# Patient Record
Sex: MALE | Race: WHITE | NOT HISPANIC OR LATINO | Employment: FULL TIME | URBAN - NONMETROPOLITAN AREA
[De-identification: names, ages, dates, MRNs, and addresses within clinical notes are randomized per-mention and may not be internally consistent; named-entity substitution may affect disease eponyms.]

---

## 2018-01-29 ENCOUNTER — OFFICE VISIT CONVERTED (OUTPATIENT)
Dept: FAMILY MEDICINE CLINIC | Age: 58
End: 2018-01-29
Attending: FAMILY MEDICINE

## 2018-10-19 ENCOUNTER — OFFICE VISIT CONVERTED (OUTPATIENT)
Dept: FAMILY MEDICINE CLINIC | Age: 58
End: 2018-10-19
Attending: FAMILY MEDICINE

## 2019-06-20 ENCOUNTER — HOSPITAL ENCOUNTER (OUTPATIENT)
Dept: OTHER | Facility: HOSPITAL | Age: 59
Discharge: HOME | End: 2019-06-20
Attending: FAMILY MEDICINE

## 2019-06-20 LAB
ALBUMIN SERPL-MCNC: 4.6 G/DL (ref 3.5–5)
ALBUMIN/GLOB SERPL: 1.6 {RATIO} (ref 1.4–2.6)
ALP SERPL-CCNC: 65 U/L (ref 56–119)
ALT SERPL-CCNC: 14 U/L (ref 10–40)
ANION GAP SERPL CALC-SCNC: 21 MMOL/L (ref 8–19)
AST SERPL-CCNC: 14 U/L (ref 15–50)
BILIRUB SERPL-MCNC: 0.88 MG/DL (ref 0.2–1.3)
BUN SERPL-MCNC: 16 MG/DL (ref 5–25)
BUN/CREAT SERPL: 18 {RATIO} (ref 6–20)
CALCIUM SERPL-MCNC: 9.2 MG/DL (ref 8.7–10.4)
CHLORIDE SERPL-SCNC: 102 MMOL/L (ref 99–111)
CHOLEST SERPL-MCNC: 169 MG/DL (ref 107–200)
CHOLEST/HDLC SERPL: 4.1 {RATIO} (ref 3–6)
CONV CO2: 21 MMOL/L (ref 22–32)
CONV TOTAL PROTEIN: 7.4 G/DL (ref 6.3–8.2)
CREAT UR-MCNC: 0.88 MG/DL (ref 0.7–1.2)
EST. AVERAGE GLUCOSE BLD GHB EST-MCNC: 148 MG/DL
GFR SERPLBLD BASED ON 1.73 SQ M-ARVRAT: >60 ML/MIN/{1.73_M2}
GLOBULIN UR ELPH-MCNC: 2.8 G/DL (ref 2–3.5)
GLUCOSE SERPL-MCNC: 169 MG/DL (ref 70–99)
HBA1C MFR BLD: 6.8 % (ref 3.5–5.7)
HDLC SERPL-MCNC: 41 MG/DL (ref 40–60)
LDLC SERPL CALC-MCNC: 106 MG/DL (ref 70–100)
OSMOLALITY SERPL CALC.SUM OF ELEC: 295 MOSM/KG (ref 273–304)
POTASSIUM SERPL-SCNC: 4.2 MMOL/L (ref 3.5–5.3)
PSA SERPL-MCNC: 0.84 NG/ML (ref 0–4)
SODIUM SERPL-SCNC: 140 MMOL/L (ref 135–147)
TRIGL SERPL-MCNC: 108 MG/DL (ref 40–150)
VLDLC SERPL-MCNC: 22 MG/DL (ref 5–37)

## 2019-06-21 ENCOUNTER — OFFICE VISIT CONVERTED (OUTPATIENT)
Dept: FAMILY MEDICINE CLINIC | Age: 59
End: 2019-06-21
Attending: FAMILY MEDICINE

## 2019-08-12 ENCOUNTER — HOSPITAL ENCOUNTER (OUTPATIENT)
Dept: OTHER | Facility: HOSPITAL | Age: 59
Discharge: HOME | End: 2019-08-12
Attending: FAMILY MEDICINE

## 2019-08-23 ENCOUNTER — OFFICE VISIT CONVERTED (OUTPATIENT)
Dept: NEUROSURGERY | Facility: CLINIC | Age: 59
End: 2019-08-23
Attending: NEUROLOGICAL SURGERY

## 2019-08-23 ENCOUNTER — CONVERSION ENCOUNTER (OUTPATIENT)
Dept: NEUROLOGY | Facility: CLINIC | Age: 59
End: 2019-08-23

## 2019-08-26 ENCOUNTER — OFFICE VISIT CONVERTED (OUTPATIENT)
Dept: FAMILY MEDICINE CLINIC | Age: 59
End: 2019-08-26
Attending: FAMILY MEDICINE

## 2019-08-26 ENCOUNTER — HOSPITAL ENCOUNTER (OUTPATIENT)
Dept: OTHER | Facility: HOSPITAL | Age: 59
Discharge: HOME | End: 2019-08-26
Attending: FAMILY MEDICINE

## 2019-08-26 LAB — TSH SERPL-ACNC: 1.17 M[IU]/L (ref 0.27–4.2)

## 2019-08-28 ENCOUNTER — CONVERSION ENCOUNTER (OUTPATIENT)
Dept: OTOLARYNGOLOGY | Facility: CLINIC | Age: 59
End: 2019-08-28

## 2019-08-28 ENCOUNTER — OFFICE VISIT CONVERTED (OUTPATIENT)
Dept: OTOLARYNGOLOGY | Facility: CLINIC | Age: 59
End: 2019-08-28
Attending: OTOLARYNGOLOGY

## 2019-09-10 ENCOUNTER — HOSPITAL ENCOUNTER (OUTPATIENT)
Dept: ULTRASOUND IMAGING | Facility: HOSPITAL | Age: 59
Discharge: HOME | End: 2019-09-10
Attending: OTOLARYNGOLOGY

## 2019-09-18 ENCOUNTER — OFFICE VISIT CONVERTED (OUTPATIENT)
Dept: OTOLARYNGOLOGY | Facility: CLINIC | Age: 59
End: 2019-09-18
Attending: OTOLARYNGOLOGY

## 2019-11-15 ENCOUNTER — OFFICE VISIT CONVERTED (OUTPATIENT)
Dept: NEUROSURGERY | Facility: CLINIC | Age: 59
End: 2019-11-15
Attending: NEUROLOGICAL SURGERY

## 2019-11-15 ENCOUNTER — CONVERSION ENCOUNTER (OUTPATIENT)
Dept: NEUROLOGY | Facility: CLINIC | Age: 59
End: 2019-11-15

## 2019-12-13 ENCOUNTER — HOSPITAL ENCOUNTER (OUTPATIENT)
Dept: OTHER | Facility: HOSPITAL | Age: 59
Discharge: HOME | End: 2019-12-13
Attending: FAMILY MEDICINE

## 2019-12-13 ENCOUNTER — OFFICE VISIT CONVERTED (OUTPATIENT)
Dept: NEUROSURGERY | Facility: CLINIC | Age: 59
End: 2019-12-13
Attending: PHYSICIAN ASSISTANT

## 2019-12-13 LAB
ALBUMIN SERPL-MCNC: 4.3 G/DL (ref 3.5–5)
ALBUMIN/GLOB SERPL: 1.5 {RATIO} (ref 1.4–2.6)
ALP SERPL-CCNC: 73 U/L (ref 56–119)
ALT SERPL-CCNC: 14 U/L (ref 10–40)
ANION GAP SERPL CALC-SCNC: 18 MMOL/L (ref 8–19)
AST SERPL-CCNC: 13 U/L (ref 15–50)
BILIRUB SERPL-MCNC: 0.59 MG/DL (ref 0.2–1.3)
BUN SERPL-MCNC: 18 MG/DL (ref 5–25)
BUN/CREAT SERPL: 18 {RATIO} (ref 6–20)
CALCIUM SERPL-MCNC: 9.4 MG/DL (ref 8.7–10.4)
CHLORIDE SERPL-SCNC: 104 MMOL/L (ref 99–111)
CHOLEST SERPL-MCNC: 154 MG/DL (ref 107–200)
CHOLEST/HDLC SERPL: 4.3 {RATIO} (ref 3–6)
CONV CO2: 25 MMOL/L (ref 22–32)
CONV TOTAL PROTEIN: 7.1 G/DL (ref 6.3–8.2)
CREAT UR-MCNC: 1 MG/DL (ref 0.7–1.2)
EST. AVERAGE GLUCOSE BLD GHB EST-MCNC: 183 MG/DL
GFR SERPLBLD BASED ON 1.73 SQ M-ARVRAT: >60 ML/MIN/{1.73_M2}
GLOBULIN UR ELPH-MCNC: 2.8 G/DL (ref 2–3.5)
GLUCOSE SERPL-MCNC: 213 MG/DL (ref 70–99)
HBA1C MFR BLD: 8 % (ref 3.5–5.7)
HDLC SERPL-MCNC: 36 MG/DL (ref 40–60)
LDLC SERPL CALC-MCNC: 95 MG/DL (ref 70–100)
OSMOLALITY SERPL CALC.SUM OF ELEC: 302 MOSM/KG (ref 273–304)
POTASSIUM SERPL-SCNC: 4.5 MMOL/L (ref 3.5–5.3)
SODIUM SERPL-SCNC: 142 MMOL/L (ref 135–147)
TRIGL SERPL-MCNC: 113 MG/DL (ref 40–150)
VLDLC SERPL-MCNC: 23 MG/DL (ref 5–37)

## 2019-12-23 ENCOUNTER — OFFICE VISIT CONVERTED (OUTPATIENT)
Dept: FAMILY MEDICINE CLINIC | Age: 59
End: 2019-12-23
Attending: FAMILY MEDICINE

## 2020-06-26 ENCOUNTER — OFFICE VISIT CONVERTED (OUTPATIENT)
Dept: FAMILY MEDICINE CLINIC | Age: 60
End: 2020-06-26
Attending: FAMILY MEDICINE

## 2020-06-29 ENCOUNTER — HOSPITAL ENCOUNTER (OUTPATIENT)
Dept: OTHER | Facility: HOSPITAL | Age: 60
Discharge: HOME | End: 2020-06-29
Attending: FAMILY MEDICINE

## 2020-07-01 LAB
ALBUMIN SERPL-MCNC: 4.3 G/DL (ref 3.5–5)
ALBUMIN/GLOB SERPL: 1.7 {RATIO} (ref 1.4–2.6)
ALP SERPL-CCNC: 54 U/L (ref 56–119)
ALT SERPL-CCNC: 11 U/L (ref 10–40)
ANION GAP SERPL CALC-SCNC: 20 MMOL/L (ref 8–19)
AST SERPL-CCNC: 12 U/L (ref 15–50)
BILIRUB SERPL-MCNC: 0.85 MG/DL (ref 0.2–1.3)
BUN SERPL-MCNC: 15 MG/DL (ref 5–25)
BUN/CREAT SERPL: 16 {RATIO} (ref 6–20)
CALCIUM SERPL-MCNC: 9.2 MG/DL (ref 8.7–10.4)
CHLORIDE SERPL-SCNC: 105 MMOL/L (ref 99–111)
CHOLEST SERPL-MCNC: 156 MG/DL (ref 107–200)
CHOLEST/HDLC SERPL: 4.2 {RATIO} (ref 3–6)
CONV CO2: 20 MMOL/L (ref 22–32)
CONV CREATININE URINE, RANDOM: 218.6 MG/DL (ref 10–300)
CONV MICROALBUM.,U,RANDOM: <12 MG/L (ref 0–20)
CONV TOTAL PROTEIN: 6.9 G/DL (ref 6.3–8.2)
CREAT UR-MCNC: 0.91 MG/DL (ref 0.7–1.2)
EST. AVERAGE GLUCOSE BLD GHB EST-MCNC: 131 MG/DL
GFR SERPLBLD BASED ON 1.73 SQ M-ARVRAT: >60 ML/MIN/{1.73_M2}
GLOBULIN UR ELPH-MCNC: 2.6 G/DL (ref 2–3.5)
GLUCOSE SERPL-MCNC: 146 MG/DL (ref 70–99)
HBA1C MFR BLD: 6.2 % (ref 3.5–5.7)
HDLC SERPL-MCNC: 37 MG/DL (ref 40–60)
LDLC SERPL CALC-MCNC: 99 MG/DL (ref 70–100)
MICROALBUMIN/CREAT UR: 5.5 MG/G{CRE} (ref 0–25)
OSMOLALITY SERPL CALC.SUM OF ELEC: 295 MOSM/KG (ref 273–304)
POTASSIUM SERPL-SCNC: 3.9 MMOL/L (ref 3.5–5.3)
SODIUM SERPL-SCNC: 141 MMOL/L (ref 135–147)
TRIGL SERPL-MCNC: 101 MG/DL (ref 40–150)
VLDLC SERPL-MCNC: 20 MG/DL (ref 5–37)

## 2021-01-04 ENCOUNTER — HOSPITAL ENCOUNTER (OUTPATIENT)
Dept: OTHER | Facility: HOSPITAL | Age: 61
Discharge: HOME | End: 2021-01-04
Attending: FAMILY MEDICINE

## 2021-01-04 ENCOUNTER — OFFICE VISIT CONVERTED (OUTPATIENT)
Dept: FAMILY MEDICINE CLINIC | Age: 61
End: 2021-01-04
Attending: FAMILY MEDICINE

## 2021-01-04 LAB
ALBUMIN SERPL-MCNC: 4.5 G/DL (ref 3.5–5)
ALBUMIN/GLOB SERPL: 1.5 {RATIO} (ref 1.4–2.6)
ALP SERPL-CCNC: 73 U/L (ref 56–119)
ALT SERPL-CCNC: 14 U/L (ref 10–40)
ANION GAP SERPL CALC-SCNC: 14 MMOL/L (ref 8–19)
AST SERPL-CCNC: 15 U/L (ref 15–50)
BILIRUB SERPL-MCNC: 0.71 MG/DL (ref 0.2–1.3)
BUN SERPL-MCNC: 14 MG/DL (ref 5–25)
BUN/CREAT SERPL: 15 {RATIO} (ref 6–20)
CALCIUM SERPL-MCNC: 10 MG/DL (ref 8.7–10.4)
CHLORIDE SERPL-SCNC: 99 MMOL/L (ref 99–111)
CHOLEST SERPL-MCNC: 192 MG/DL (ref 107–200)
CHOLEST/HDLC SERPL: 4.8 {RATIO} (ref 3–6)
CONV CO2: 27 MMOL/L (ref 22–32)
CONV TOTAL PROTEIN: 7.5 G/DL (ref 6.3–8.2)
CREAT UR-MCNC: 0.95 MG/DL (ref 0.7–1.2)
EST. AVERAGE GLUCOSE BLD GHB EST-MCNC: 131 MG/DL
GFR SERPLBLD BASED ON 1.73 SQ M-ARVRAT: >60 ML/MIN/{1.73_M2}
GLOBULIN UR ELPH-MCNC: 3 G/DL (ref 2–3.5)
GLUCOSE SERPL-MCNC: 224 MG/DL (ref 70–99)
HBA1C MFR BLD: 6.2 % (ref 3.5–5.7)
HDLC SERPL-MCNC: 40 MG/DL (ref 40–60)
LDLC SERPL CALC-MCNC: 103 MG/DL (ref 70–100)
OSMOLALITY SERPL CALC.SUM OF ELEC: 289 MOSM/KG (ref 273–304)
POTASSIUM SERPL-SCNC: 4.4 MMOL/L (ref 3.5–5.3)
SODIUM SERPL-SCNC: 136 MMOL/L (ref 135–147)
TRIGL SERPL-MCNC: 246 MG/DL (ref 40–150)
VLDLC SERPL-MCNC: 49 MG/DL (ref 5–37)

## 2021-05-15 VITALS
TEMPERATURE: 98 F | SYSTOLIC BLOOD PRESSURE: 134 MMHG | WEIGHT: 211 LBS | BODY MASS INDEX: 26.24 KG/M2 | HEIGHT: 75 IN | DIASTOLIC BLOOD PRESSURE: 81 MMHG | RESPIRATION RATE: 16 BRPM | HEART RATE: 72 BPM | OXYGEN SATURATION: 99 %

## 2021-05-15 VITALS
DIASTOLIC BLOOD PRESSURE: 67 MMHG | HEIGHT: 75 IN | WEIGHT: 211.5 LBS | SYSTOLIC BLOOD PRESSURE: 124 MMHG | BODY MASS INDEX: 26.3 KG/M2

## 2021-05-15 VITALS
BODY MASS INDEX: 25.99 KG/M2 | HEIGHT: 75 IN | RESPIRATION RATE: 16 BRPM | WEIGHT: 209 LBS | SYSTOLIC BLOOD PRESSURE: 122 MMHG | DIASTOLIC BLOOD PRESSURE: 72 MMHG | OXYGEN SATURATION: 99 % | HEART RATE: 63 BPM | TEMPERATURE: 98.3 F

## 2021-05-15 VITALS
SYSTOLIC BLOOD PRESSURE: 128 MMHG | DIASTOLIC BLOOD PRESSURE: 70 MMHG | BODY MASS INDEX: 26.14 KG/M2 | WEIGHT: 210.25 LBS | HEIGHT: 75 IN

## 2021-05-15 VITALS
DIASTOLIC BLOOD PRESSURE: 61 MMHG | HEIGHT: 75 IN | WEIGHT: 210.37 LBS | SYSTOLIC BLOOD PRESSURE: 107 MMHG | BODY MASS INDEX: 26.16 KG/M2

## 2021-05-18 NOTE — PROGRESS NOTES
Octavio Mercado  1960     Office/Outpatient Visit    Visit Date: Mon, Dec 23, 2019 09:44 am    Provider: Jaden Carlson MD (Assistant: Olya Blackwood MA)    Location: [facility]        Electronically signed by Jaden Carlson MD on  01/03/2020 07:25:07 AM                             Subjective:        CC: Hardeep is a 59 year old White male.  This is a follow-up visit.  check up         HPI:       He is got diabetes mellitus and does not really check his blood sugars all that often he says.He has been on vacation to [facility] and probably not adhering to the diet as well as he usually would.  His recent labs show significant elevation in blood sugar and hemoglobin A1c at 8.0.He says he is going to tighten up his diet see if he can bring the numbers under better control.  I am not having to monitor his blood sugars on a daily basis and after a few weeks drop off list of those readings for me to review    Hardeep presents with type 2 diabetes mellitus without complications.  Compliance with treatment has been good.  He specifically denies associated symptoms, including nocturia, polydipsia and polyuria.  He reports home blood glucose readings have been excellent, with average fasting glucoses running <120 mg/dL. He checks his glucose 1 to 2 times daily.            Essential (primary) hypertension details; compliance with treatment has been good.  He is tolerating the medication well without side effects.  He did not bring his blood pressure diary, but says that pressures have been okay.        Tells me is continued have some issues at times with right shoulder and arm pain.  He was sent by [employer] to some kind of [employer] health [facility] and [place] but we have not seen the results of that report.      He did see ENT in regards to the thyroid nodule.  He is supposed to follow-up with him after the first of the year.    ROS:     CONSTITUTIONAL:  Negative for chills, fatigue and  fever.      CARDIOVASCULAR:  Negative for chest pain, orthopnea, paroxysmal nocturnal dyspnea and pedal edema.      RESPIRATORY:  Negative for dyspnea and cough.      GASTROINTESTINAL:  Negative for abdominal pain, heartburn, constipation, diarrhea, and stool changes.      GENITOURINARY:  Negative for dysuria and polyuria.      PSYCHIATRIC:  Negative for anxiety and depression.          Past Medical History / Family History / Social History:         Last Reviewed on 12/23/2019 10:50 AM by Jaden Carlson    Past Medical History:         Positive for    fell off ladder 2014;         PREVENTIVE HEALTH MAINTENANCE             COLORECTAL CANCER SCREENING: Up to date (colonoscopy q10y; sigmoidoscopy q5y; Cologuard q3y) was last done 05/19/17, Results are in chart         Surgical History:         Cholecystectomy: 11/00;         Family History:     Father: Myocardial Infarction;  Esophageal Cancer     Positive for Pancreatric Cancer ( pat. GF; mat. aunt -- x2 ).      Positive for Type 2 Diabetes ( mat. GF; pat. uncle; pat. aunt ).      Positive for Alcoholism ( father ).          Social History:  for [employer]         Tobacco/Alcohol/Supplements:     Last Reviewed on 12/23/2019 09:45 AM by Olya Blackwood    Tobacco: He has never smoked.          Alcohol: Frequency:    rare;         Substance Abuse History:     Last Reviewed on 3/29/2017 09:20 AM by Jaden Carlson        Mental Health History:     Last Reviewed on 3/29/2017 09:20 AM by Jaden Carlson        Communicable Diseases (eg STDs):     Last Reviewed on 3/29/2017 09:20 AM by Jaden Carlson        Allergies:     Last Reviewed on 12/23/2019 09:45 AM by Olya Blackwood    No Known Allergies.        Current Medications:     Last Reviewed on 12/23/2019 09:45 AM by Olya Blackwood    Metformin HCl 500mg Tablet [Take 1 tablet(s) by mouth bid]    Lisinopril 10mg Tablet [Take 1 tablet(s) by mouth daily]        Objective:        Vitals:          Current: 12/23/2019 9:51:08 AM    Ht:  6 ft, 3 in;  Wt: 217 lbs;  BMI: 27.1T: 98.1 F (oral);  BP: 131/64 mm Hg (left arm, sitting);  P: 69 bpm (left arm (BP Cuff), sitting);  sCr: 1 mg/dL;  GFR: 88.77        Exams:     PHYSICAL EXAM:     GENERAL: mildly obese;  well groomed;  no apparent distress;     EYES: Nonicteric and with unremarkable lids, iris and pupils;     E/N/T: EARS: unremarkable; OROPHARYNX: clear;     NECK: thyroid exam reveals no discrete nodules;  carotid exam reveals no bruits;     RESPIRATORY: normal respiratory rate and pattern with no distress; normal breath sounds with no rales, rhonchi, wheezes or rubs;     CARDIOVASCULAR: normal rate; rhythm is regular;  no systolic murmur;     LYMPHATIC: no enlargement of cervical or facial nodes;     MUSCULOSKELETAL: normal overall tone No pedal edema.;     NEUROLOGIC: No lateralizing deficit.   Monofilament test good  bilat feet.;     PSYCHIATRIC:  appropriate affect and demeanor; normal speech pattern; grossly normal memory;     Right foot exam    Protective sensation using Monofilament test: NORMAL sensation. Patient detects .07 grams of force which is considered normal.    Vascular status: normal peripheral vascular exam with palpable dorsal pedal and posterior tibal pulses and brisk digital capillary refill    Skin is intact without sores or ulcers         Assessment:         E11.9   Type 2 diabetes mellitus without complications       I10   Essential (primary) hypertension       M25.511   Pain in right shoulder       E04.0   Nontoxic diffuse goiter           ORDERS:         Meds Prescribed:       [Refilled] Lisinopril 10 mg oral tablet [Take 1 tablet(s) by mouth daily], #90 (ninety) tablets, Refills: 1 (one)       [Refilled] metFORMIN 500 mg oral tablet [Take 1 tablet(s) by mouth bid], #180 (one hundred and eighty) tablets, Refills: 1 (one)         Other Orders:       2028F  Foot examination performed (includes examination through visual inspection,  sensory exam with monofilament, and pulse exam - report when any of the three components are completed) (DM)4  (In-House)                      Plan:         Type 2 diabetes mellitus without complicationsWill have him keep log of BS and then if can't get BS down consider adding other meds          Prescriptions:       [Refilled] metFORMIN 500 mg oral tablet [Take 1 tablet(s) by mouth bid], #180 (one hundred and eighty) tablets, Refills: 1 (one)         Essential (primary) hypertension          Prescriptions:       [Refilled] Lisinopril 10 mg oral tablet [Take 1 tablet(s) by mouth daily], #90 (ninety) tablets, Refills: 1 (one)         Pain in right shoulderWe will send for copy of records from his work health doctor        Nontoxic diffuse goiterHe will follow-up with ENT after the first of the year in regards to the thyroid nodule            Other Orders      2028F  Foot examination performed (includes examination through visual inspection, sensory exam with monofilament, and pulse exam - report when any of the three components are completed) (DM)4  (In-House)              Other Patient Education Handouts:     Dragon transcription disclaimer:        Much of this encounter note is an electronic transcription/translation of spoken language to printed text.  The electronic translation of spoken language may permit erroneous, or at times, nonsensical words or phrases to be inadvertently transcribed.  Although I have reviewed the note for such errors, some may still exist.        Charge Capture:         Primary Diagnosis:     E11.9  Type 2 diabetes mellitus without complications           Orders:      09631  Office/outpatient visit; established patient, level 4  (In-House)              I10  Essential (primary) hypertension     M25.511  Pain in right shoulder     E04.0  Nontoxic diffuse goiter         Other Orders:       2028F  Foot examination performed (includes examination through visual inspection, sensory exam with  monofilament, and pulse exam - report when any of the three components are completed) (DM)4  (In-House)

## 2021-05-18 NOTE — PROGRESS NOTES
Octavio Mercado  1960     Office/Outpatient Visit    Visit Date: Mon, Jan 4, 2021 08:14 am    Provider: Jaden Carlson MD (Assistant: Genoveva Sampson MA)    Location: [facility]        Electronically signed by Jaden Carlson MD on  01/04/2021 08:46:52 AM                             Subjective:        CC: Hardeep is a 60 year old White male.  This is a follow-up visit.  6 months         HPI:           Hardeep presents with type 2 diabetes mellitus without complications.  Compliance with treatment has been good.  He specifically denies associated symptoms, including nocturia, polydipsia and polyuria.  He reports home blood glucose readings have been excellent, with average fasting glucoses running <120 mg/dL. He checks his glucose 1 to 2 times daily.            In regard to the essential (primary) hypertension, compliance with treatment has been good.  He is tolerating the medication well without side effects.  Hardeep does not check his blood pressure other than at his clinic appointments.  Will geet BP ck when donates blood and usually good.    ROS:     CONSTITUTIONAL:  Negative for chills, fatigue and fever.      CARDIOVASCULAR:  Negative for chest pain, orthopnea, paroxysmal nocturnal dyspnea and pedal edema.      RESPIRATORY:  Negative for dyspnea and cough.      GASTROINTESTINAL:  Negative for abdominal pain, heartburn, constipation, diarrhea, and stool changes.      GENITOURINARY:  Negative for dysuria and polyuria.      PSYCHIATRIC:  Negative for anxiety and depression.          Past Medical History / Family History / Social History:         Last Reviewed on 1/04/2021 08:37 AM by Jaden Carlson    Past Medical History:         Positive for    fell off ladder 2014;         PREVENTIVE HEALTH MAINTENANCE             COLORECTAL CANCER SCREENING: Up to date (colonoscopy q10y; sigmoidoscopy q5y; Cologuard q3y) was last done 05/19/17, Results are in chart         Surgical  History:         Cholecystectomy: 11/00;         Family History:     Father: Myocardial Infarction;  Esophageal Cancer     Positive for Pancreatric Cancer ( pat. GF; mat. aunt -- x2 ).      Positive for Type 2 Diabetes ( mat. GF; pat. uncle; pat. aunt ).      Positive for Alcoholism ( father ).          Social History: [place] for [employer]         Tobacco/Alcohol/Supplements:     Last Reviewed on 1/04/2021 08:17 AM by Genoveva Sampson    Tobacco: He has never smoked.          Alcohol: Frequency:    rare;         Substance Abuse History:     Last Reviewed on 3/29/2017 09:20 AM by Jaden Carlson        Mental Health History:     Last Reviewed on 3/29/2017 09:20 AM by Jaden Carlson        Communicable Diseases (eg STDs):     Last Reviewed on 3/29/2017 09:20 AM by Jaden Carlson        Allergies:     Last Reviewed on 1/04/2021 08:17 AM by Genoveva Sampson    No Known Allergies.        Current Medications:     Last Reviewed on 1/04/2021 08:17 AM by Genoveva Sampson    metFORMIN 500 mg oral Tablet, Extended Release 24 hr [TAKE 2 TABLETS BY MOUTH TWICE DAILY WITH MEALS]    Lisinopril 10 mg oral tablet [Take 1 tablet(s) by mouth daily]        Objective:        Vitals:         Current: 1/4/2021 8:19:13 AM    Ht:  6 ft, 3 in;  Wt: 211 lbs;  BMI: 26.4T: 96.7 F (temporal);  BP: 132/68 mm Hg (left arm, sitting);  P: 72 bpm (left arm (BP Cuff), sitting);  sCr: 0.91 mg/dL;  GFR: 95.25        Exams:     PHYSICAL EXAM:     GENERAL:  well developed and nourished; appropriately groomed; in no apparent distress;     EYES: Nonicteric and with unremarkable lids, iris and pupils;     NECK: carotid exam reveals no bruits;     RESPIRATORY: normal respiratory rate and pattern with no distress; normal breath sounds with no rales, rhonchi, wheezes or rubs;     CARDIOVASCULAR: normal rate; rhythm is regular;  no systolic murmur;     LYMPHATIC: no enlargement of cervical or facial nodes;     MUSCULOSKELETAL: normal overall tone No  pedal edema.;     NEUROLOGIC: No lateralizing deficit.;     PSYCHIATRIC:  appropriate affect and demeanor; normal speech pattern; grossly normal memory;     Right foot exam    Protective sensation using Monofilament test: NORMAL sensation. Patient detects .07 grams of force which is considered normal.    Vascular status: normal peripheral vascular exam with palpable dorsal pedal and posterior tibal pulses and brisk digital capillary refill    Skin is intact without sores or ulcers         Assessment:         E11.9   Type 2 diabetes mellitus without complications       I10   Essential (primary) hypertension           ORDERS:         Meds Prescribed:       [Refilled] Lisinopril 10 mg oral tablet [Take 1 tablet(s) by mouth daily], #90 (ninety) tablets, Refills: 1 (one)       [Refilled] metFORMIN 500 mg oral Tablet, Extended Release 24 hr [TAKE 2 TABLETS BY MOUTH TWICE DAILY WITH MEALS], #360 (three hundred and sixty) tablets, Refills: 1 (one)         Lab Orders:       [id number]  DIAB - [facility] LIPID,CMP, A1C: 71691, 99521, 02273  (Send-Out)              Other Orders:       2028F  Foot examination performed (includes examination through visual inspection, sensory exam with monofilament, and pulse exam - report when any of the three components are completed) (DM)4  (In-House)                      Plan:         Type 2 diabetes mellitus without complicationsremind him to get eye exam    LABORATORY:  Labs ordered to be performed today include Diabetes Panel 1; CMP, Lipid, A1C.            Prescriptions:       [Refilled] metFORMIN 500 mg oral Tablet, Extended Release 24 hr [TAKE 2 TABLETS BY MOUTH TWICE DAILY WITH MEALS], #360 (three hundred and sixty) tablets, Refills: 1 (one)           Orders:       [id number]  DIAB - [facility] LIPID,CMP, A1C: 53871, 76200, 53818  (Send-Out)              Essential (primary) hypertension          Prescriptions:       [Refilled] Lisinopril 10 mg oral tablet [Take 1 tablet(s) by mouth daily], #90 (ninety)  tablets, Refills: 1 (one)             Other Orders      2028F  Foot examination performed (includes examination through visual inspection, sensory exam with monofilament, and pulse exam - report when any of the three components are completed) (DM)4  (In-House)              Charge Capture:         Primary Diagnosis:     E11.9  Type 2 diabetes mellitus without complications           Orders:      [id number]  Office/outpatient visit; established patient, level 3  (In-House)              I10  Essential (primary) hypertension         Other Orders:       2028F  Foot examination performed (includes examination through visual inspection, sensory exam with monofilament, and pulse exam - report when any of the three components are completed) (DM)4  (In-House)              ADDENDUMS:      ____________________________________    Addendum: 01/07/2021 09:49 PM - Jaden Carlson        Remove   [id number]        Add   [id number]

## 2021-05-18 NOTE — PROGRESS NOTES
Octavio Mercado 1960     Office/Outpatient Visit    Visit Date: Mon, Aug 26, 2019 12:56 pm    Provider: Jaden Carlson MD (Assistant: Sarah Spurling, MA)    Location: [facility]        Electronically signed by Jaden Carlson MD on  08/26/2019 05:24:46 PM                             SUBJECTIVE:        CC:     Hardeep is a 59 year old White male.  Here to discuss labs.          HPI:     Hardeep and been getting work-up for his neck and shoulder pain and work health clinic that he went to ordered an MRI scan of his neck.  I reviewed the report with him.  The MRI was obtained at [facility].  They mention that he had a thyroid nodule and recommended ultrasound follow-up.  So before his appointment today I went on to have him schedule an ultrasound and we reviewed that result today as well.  The radiologist recommends proceeding with ultrasound-guided biopsy.     ROS:     CONSTITUTIONAL:  Negative for chills, fatigue and fever.      CARDIOVASCULAR:  Negative for chest pain, orthopnea, paroxysmal nocturnal dyspnea and pedal edema.      RESPIRATORY:  Negative for dyspnea and cough.      GASTROINTESTINAL:  Negative for abdominal pain, heartburn, constipation, diarrhea, and stool changes.      GENITOURINARY:  Negative for dysuria and polyuria.      PSYCHIATRIC:  Negative for anxiety and depression.          [facility]/SH:     Last Reviewed on 6/21/2019 10:20 AM by Jaden Carlson    Past Medical History:         Positive for    fell off ladder 2014;         PREVENTIVE HEALTH MAINTENANCE             COLORECTAL CANCER SCREENING: Up to date (colonoscopy q10y; sigmoidoscopy q5y; Cologuard q3y) was last done 05/19/17, Results are in chart         Surgical History:         Cholecystectomy: 11/00;         Family History:     Father: Myocardial Infarction;  Esophageal Cancer     Positive for Pancreatric Cancer ( pat. GF; mat. aunt -- x2 ).      Positive for Type 2 Diabetes ( mat. GF; pat. uncle; pat.  aunt ).      Positive for Alcoholism ( father ).          Social History:  for [employer]         Tobacco/Alcohol/Supplements:     Last Reviewed on 6/21/2019 09:37 AM by Spurling, Sarah C    Tobacco: He has never smoked.          Alcohol: Frequency:    rare;         Substance Abuse History:     Last Reviewed on 3/29/2017 09:20 AM by Jaden Carlson        Mental Health History:     Last Reviewed on 3/29/2017 09:20 AM by Jaden Carlson        Communicable Diseases (eg STDs):     Last Reviewed on 3/29/2017 09:20 AM by Jaden Carlson            Allergies:     Last Reviewed on 6/21/2019 09:37 AM by Spurling, Sarah C      No Known Drug Allergies.         Current Medications:     Last Reviewed on 6/21/2019 09:41 AM by Spurling, Sarah C    Lisinopril 10mg Tablet Take 1 tablet(s) by mouth daily     Metformin HCl 500mg Tablet Take 1 tablet(s) by mouth bid         OBJECTIVE:        Vitals:         Current: 8/26/2019 1:00:21 PM    Ht:  6 ft, 3 in;  Wt: 210 lbs;  BMI: 26.2    T: 98 F (oral);  BP: 115/70 mm Hg (right arm, sitting);  P: 72 bpm (right arm (BP Cuff), sitting);  sCr: 0.88 mg/dL;  GFR: 99.48        Exams:     PHYSICAL EXAM:     GENERAL: well developed, well nourished;  well groomed;  no apparent distress;     EYES: lids and lacrimal system are normal in appearance; nonicteric;  conjunctiva and cornea are normal; pupils and irises are normal;     E/N/T: EARS: external auditory canal normal;  bilateral TMs are normal;  OROPHARYNX:  normal mucosa, dentition, gingiva, and posterior pharynx;     NECK: trachea is midline; thyroid exam reveals no discrete nodules;  carotid exam reveals no bruits;     RESPIRATORY: normal appearance and symmetric expansion of chest wall; normal respiratory rate and pattern with no distress; normal breath sounds with no rales, rhonchi, wheezes or rubs;     CARDIOVASCULAR: normal rate; rhythm is regular;  a systolic murmur is noted: it is grade 2/6;  no diastolic  murmur; no edema;     GASTROINTESTINAL: nontender; normal bowel sounds; no organomegaly; no abdominal or renal bruits;     LYMPHATIC: no enlargement of cervical or facial nodes;     SKIN: no atypical or suspicious moles;     MUSCULOSKELETAL: muscle strength: grossly normal;     NEUROLOGIC: mental status: alert and oriented x 3; cranial nerves: grossly intact;  no lateralizing or focal neurologic deficits;         ASSESSMENT           241.0   E04.0  Thyroid nodule              DDx:         ORDERS:         Lab Orders:       [id number]  [facility] TSH  (Send-Out)           Procedures Ordered:       REFER  Referral to Specialist or Other Facility  (Send-Out)                   PLAN:          Thyroid nodule I will go ahead and get him into see the ENT folks and let them manage his work-up of the thyroid nodule. I gave him copies of the MRI scan and ultrasound reports.  I did advise him to take a disc of the MRI scan with him to the appointment.     LABORATORY:  Labs ordered to be performed today include TSH.      REFERRALS:  Referral initiated to an E/N/T ( Dr. Gil Day, [facility] ENT ).            Orders:       REFER  Referral to Specialist or Other Facility  (Send-Out)         [id number]  [facility] TSH  (Send-Out)               CHARGE CAPTURE           **Please note: ICD descriptions below are intended for billing purposes only and may not represent clinical diagnoses**        Primary Diagnosis:         241.0 Thyroid nodule            E04.0    Nontoxic diffuse goiter              Orders:          [id number]   Office/outpatient visit; established patient, level 3  (In-House)

## 2021-05-18 NOTE — PROGRESS NOTES
Octavio Mercado 1960     Office/Outpatient Visit    Visit Date: Fri, Oct 19, 2018 10:43 am    Provider: Jaden Carlson MD (Assistant: Rosie Armstrong LPN)    Location: [facility]        Electronically signed by Jaden Carlson MD on  10/25/2018 07:38:23 PM                             SUBJECTIVE:        CC:     Hardeep is a 58 year old White male.  This is a follow-up visit.  med refills         HPI:         Patient presents with hypertension.  He did not bring his blood pressure diary, but says that pressures have been okay.  He is tolerating the medication well without side effects.  Compliance with treatment has been good.          Additionally, he presents with history of type 2 diabetes.  compliance with treatment has been good.  He specifically denies associated symptoms, including nocturia, polydipsia and polyuria.  He reports home blood glucose readings have been excellent, with average fasting glucoses running <120 mg/dL. He checks his glucose 1 to 2 times daily.      ROS:     CONSTITUTIONAL:  Negative for chills, fatigue, fever and weight change.      CARDIOVASCULAR:  Negative for chest pain, orthopnea, paroxysmal nocturnal dyspnea and pedal edema.      RESPIRATORY:  Negative for dyspnea and cough.      GASTROINTESTINAL:  Negative for abdominal pain, heartburn, constipation, diarrhea, and stool changes.      GENITOURINARY:  Negative for dysuria and polyuria.      PSYCHIATRIC:  Negative for anxiety and depression.          PM/Health system/:     Last Reviewed on 10/19/2018 11:19 AM by Jaden Carlson    Past Medical History:         Positive for    fell off ladder 2014;         PREVENTIVE HEALTH MAINTENANCE             COLORECTAL CANCER SCREENING: Up to date (colonoscopy q10y; sigmoidoscopy q5y; Cologuard q3y) was last done 05/19/17, Results are in chart         Surgical History:         Cholecystectomy: 11/00;         Family History:     Father: Myocardial Infarction;  Esophageal Cancer      Positive for Pancreatric Cancer ( pat. GF; mat. aunt -- x2 ).      Positive for Type 2 Diabetes ( mat. GF; pat. uncle; pat. aunt ).      Positive for Alcoholism ( father ).          Social History: [employer] for [employer]         Tobacco/Alcohol/Supplements:     Last Reviewed on 10/19/2018 10:44 AM by Rosie Armstrong    Tobacco: He has never smoked.          Alcohol: Frequency:    rare;         Substance Abuse History:     Last Reviewed on 3/29/2017 09:20 AM by Jaden Carlson        Mental Health History:     Last Reviewed on 3/29/2017 09:20 AM by Jaden Carlson        Communicable Diseases (eg STDs):     Last Reviewed on 3/29/2017 09:20 AM by Jaden Carlson            Allergies:     Last Reviewed on 10/19/2018 10:43 AM by Rosie Armstrong      No Known Drug Allergies.         Current Medications:     Last Reviewed on 10/19/2018 10:44 AM by Rosie Armstrong    Lisinopril 10mg Tablet Take 1 tablet(s) by mouth daily     Metformin HCl 500mg Tablet Take 1 tablet(s) by mouth bid         OBJECTIVE:        Vitals:         Current: 10/19/2018 10:45:31 AM    Ht:  6 ft, 3 in;  Wt: 210.8 lbs;  BMI: 26.3    T: 98.2 F (oral);  BP: 133/76 mm Hg (right arm, sitting);  P: 68 bpm (right arm (BP Cuff), sitting);  sCr: 0.95 mg/dL;  GFR: 93.39        Exams:     PHYSICAL EXAM:     GENERAL:  well developed and nourished; appropriately groomed; in no apparent distress;     EYES: Nonicteric and with unremarkable lids, iris and pupils;     E/N/T:  normal EACs, TMs, nasal/oral mucosa, teeth, gingiva, and oropharynx;     NECK: carotid exam reveals no bruits;     RESPIRATORY: normal respiratory rate and pattern with no distress; normal breath sounds with no rales, rhonchi, wheezes or rubs;     CARDIOVASCULAR: normal rate; rhythm is regular;  no systolic murmur;     LYMPHATIC: no enlargement of cervical or facial nodes;     MUSCULOSKELETAL: normal overall tone No pedal edema.;     NEUROLOGIC: No  lateralizing deficit.;     PSYCHIATRIC:  appropriate affect and demeanor; normal speech pattern; grossly normal memory;         Procedures:     Vaccination against other viral diseases, Influenza     1. Influenza, seasonal PF (children 3 years to adult): 0.5 ml unit dose given IM in the right upper arm; administered by SCS;  lot number [id number]; expires 6-30-19             ASSESSMENT           401.1   I10  Hypertension              DDx:     250.00   E11.9  Type 2 diabetes              DDx:     V04.81   Z23  Vaccination against other viral diseases, Influenza              DDx:         ORDERS:         Meds Prescribed:       Refill of: Lisinopril 10mg Tablet Take 1 tablet(s) by mouth daily  #90 (Ninety) tablet(s) Refills: 1       Refill of: Metformin HCl (Metformin HCl)  500mg Tablet Take 1 tablet(s) by mouth bid  #180 (One [place] and Eighty) tablet(s) Refills: 1         Lab Orders:       APPTO  Appointment need  (In-House)           Other Orders:       10710  Influenza virus vaccine, quadrivalent, split virus, preservative free 3 years of age & older  (In-House)                   PLAN:          Hypertension         FOLLOW-UP: Schedule a follow-up visit in 6 months..            Prescriptions:       Refill of: Lisinopril 10mg Tablet Take 1 tablet(s) by mouth daily  #90 (Ninety) tablet(s) Refills: 1           Orders:       APPTO  Appointment need  (In-House)            Type 2 diabetes           Prescriptions:       Refill of: Metformin HCl (Metformin HCl)  500mg Tablet Take 1 tablet(s) by mouth bid  #180 (One [place] and Eighty) tablet(s) Refills: 1          Vaccination against other viral diseases, Influenza         IMMUNIZATIONS given today: Influenza Quadrivalent psv free shot 3 & up.            Orders:       78435  Influenza virus vaccine, quadrivalent, split virus, preservative free 3 years of age & older  (In-House)               Patient Recommendations:        For  Hypertension:     Schedule a follow-up visit in 6  months.                APPOINTMENT INFORMATION:        Monday Tuesday Wednesday Thursday Friday Saturday Sunday            Time:___________________AM  PM   Date:_____________________             CHARGE CAPTURE           **Please note: ICD descriptions below are intended for billing purposes only and may not represent clinical diagnoses**        Primary Diagnosis:         401.1 Hypertension            I10    Essential (primary) hypertension              Orders:          43031   Office/outpatient visit; established patient, level 3  (In-House)             APPTO   Appointment need  (In-House)           250.00 Type 2 diabetes            E11.9    Type 2 diabetes mellitus without complications    V04.81 Vaccination against other viral diseases, Influenza            Z23    Encounter for immunization              Orders:          [id number]   Influenza virus vaccine, quadrivalent, split virus, preservative free 3 years of age & older  (In-House)               ADDENDUMS:      ____________________________________    Addendum: 10/29/2018 01:11 PM - Spurling, Sarah C         42494  Immunization administration; one vaccine  (In-House)

## 2021-05-18 NOTE — PROGRESS NOTES
Mercado Octavio LUCILA 1960     Office/Outpatient Visit    Visit Date: Fri, Jun 21, 2019 09:33 am    Provider: Jaden Carlson MD (Assistant: Sarah Spurling, MA)    Location: [facility]        Electronically signed by Jaden Carlson MD on  06/21/2019 10:27:22 AM                             SUBJECTIVE:        CC:     Hardeep is a 59 year old White male.  This is a follow-up visit.          HPI:         Patient to be evaluated for hypertension.  He did not bring his blood pressure diary, but says that pressures have been okay.  He is tolerating the medication well without side effects.  Compliance with treatment has been good.          In regard to the type 2 diabetes, compliance with treatment has been good.  He specifically denies associated symptoms, including nocturia, polydipsia and polyuria.  He does not perform home blood glucose monitoring.  He is a little concerned about his blood sugars because he is been on steroids for his neck.  Despite that though his hemoglobin A1c looks great.     He evidently had some kind of a injury at work pulled a muscle in his neck or something that nature. Saw Dannielle Soto and she has him going to [facility], but he says he is not getting any better so he is going back to her next week.      ROS:     CONSTITUTIONAL:  Negative for chills, fatigue and fever.      CARDIOVASCULAR:  Negative for chest pain, orthopnea, paroxysmal nocturnal dyspnea and pedal edema.      RESPIRATORY:  Negative for dyspnea and cough.      GASTROINTESTINAL:  Negative for abdominal pain, heartburn, constipation, diarrhea, and stool changes.      GENITOURINARY:  Negative for dysuria and polyuria.      PSYCHIATRIC:  Negative for anxiety and depression.          PMH/FMH/SH:     Last Reviewed on 6/21/2019 10:20 AM by Jaden Carlson    Past Medical History:         Positive for    fell off ladder 2014;         PREVENTIVE HEALTH MAINTENANCE             COLORECTAL CANCER SCREENING: Up to date  (colonoscopy q10y; sigmoidoscopy q5y; Cologuard q3y) was last done 05/19/17, Results are in chart         Surgical History:         Cholecystectomy: 11/00;         Family History:     Father: Myocardial Infarction;  Esophageal Cancer     Positive for Pancreatric Cancer ( pat. GF; mat. aunt -- x2 ).      Positive for Type 2 Diabetes ( mat. GF; pat. uncle; pat. aunt ).      Positive for Alcoholism ( father ).          Social History:  for [employer]         Tobacco/Alcohol/Supplements:     Last Reviewed on 6/21/2019 09:37 AM by Spurling, Sarah C    Tobacco: He has never smoked.          Alcohol: Frequency:    rare;         Substance Abuse History:     Last Reviewed on 3/29/2017 09:20 AM by Jaden Carlson        Mental Health History:     Last Reviewed on 3/29/2017 09:20 AM by Jaden Carlson        Communicable Diseases (eg STDs):     Last Reviewed on 3/29/2017 09:20 AM by Jaden Carlson            Allergies:     Last Reviewed on 6/21/2019 09:37 AM by Spurling, Sarah C      No Known Drug Allergies.         Current Medications:     Last Reviewed on 6/21/2019 09:41 AM by Spurling, Sarah C    Lisinopril 10mg Tablet Take 1 tablet(s) by mouth daily     Metformin HCl 500mg Tablet Take 1 tablet(s) by mouth bid         OBJECTIVE:        Vitals:         Current: 6/21/2019 9:43:03 AM    Ht:  6 ft, 3 in;  Wt: 205.8 lbs;  BMI: 25.7    T: 98.4 F (oral);  BP: 126/76 mm Hg (left arm, sitting);  P: 73 bpm (left arm (BP Cuff), sitting);  sCr: 0.88 mg/dL;  GFR: 98.63        Exams:     PHYSICAL EXAM:     GENERAL: well developed, well nourished;  well groomed;  no apparent distress;     EYES: Nonicteric and with unremarkable lids, iris and pupils;     E/N/T: EARS: unremarkable; OROPHARYNX: clear;     NECK: thyroid exam reveals no discrete nodules;  carotid exam reveals no bruits; His neck has good range of motion but with right lateral flexion he says it does feel tight and even pulls underneath his right axilla.      RESPIRATORY: normal respiratory rate and pattern with no distress; normal breath sounds with no rales, rhonchi, wheezes or rubs;     CARDIOVASCULAR: normal rate; rhythm is regular;  no systolic murmur;     LYMPHATIC: no enlargement of cervical or facial nodes;     MUSCULOSKELETAL: normal overall tone No pedal edema.;     NEUROLOGIC: No lateralizing deficit.   Monofilament test good  bilat feet.; DTRs are symmetrical at the biceps and  strength is symmetrical bilaterally as well.     PSYCHIATRIC:  appropriate affect and demeanor; normal speech pattern; grossly normal memory;     Left foot exam    Protective sensation using Monofilament test: NORMAL sensation. Patient detects .07 grams of force which is considered normal.    Vascular status: normal peripheral vascular exam with palpable dorsal pedal and posterior tibal pulses and brisk digital capillary refill    Skin is intact without sores or ulcers    Right foot exam    Protective sensation using Monofilament test: NORMAL sensation. Patient detects .07 grams of force which is considered normal.    Vascular status: normal peripheral vascular exam with palpable dorsal pedal and posterior tibal pulses and brisk digital capillary refill    Skin is intact without sores or ulcers         ASSESSMENT           401.1   I10  Hypertension              DDx:     250.00   E11.9  Type 2 diabetes              DDx:     V79.0   Z13.89  Screening for depression              DDx:     847.0   S13.4XXA  Neck strain              DDx:         ORDERS:         Meds Prescribed:       Refill of: Lisinopril 10mg Tablet Take 1 tablet(s) by mouth daily  #90 (Ninety) tablet(s) Refills: 1       Refill of: Metformin HCl 500mg Tablet Take 1 tablet(s) by mouth bid  #180 (One [place] and Eighty) tablet(s) Refills: 1         Other Orders:         Depression screen negative  (In-House)         2028F  Foot examination performed (includes examination through visual inspection, sensory exam  with monofi  (In-House)                   PLAN:          Hypertension Continue on his current medication          Type 2 diabetes Reviewed his labs look great continue on current medication           Prescriptions:       Refill of: Lisinopril 10mg Tablet Take 1 tablet(s) by mouth daily  #90 (Ninety) tablet(s) Refills: 1       Refill of: Metformin HCl 500mg Tablet Take 1 tablet(s) by mouth bid  #180 (One [place] and Eighty) tablet(s) Refills: 1          Screening for depression     MIPS PHQ-9 Depression Screening: Completed form scanned and in chart; Total Score 0; Negative Depression Screen           Orders:         Depression screen negative  (In-House)            Neck strain He should follow-up with [facility] as they recommend.             Other Orders:       2028F  Foot examination performed (includes examination through visual inspection, sensory exam with monofi  (In-House)           CHARGE CAPTURE           **Please note: ICD descriptions below are intended for billing purposes only and may not represent clinical diagnoses**        Primary Diagnosis:         401.1 Hypertension            I10    Essential (primary) hypertension              Orders:          [id number]   Office/outpatient visit; established patient, level 4  (In-House)           250.00 Type 2 diabetes            E11.9    Type 2 diabetes mellitus without complications    V79.0 Screening for depression            Z13.89    Encounter for screening for other disorder              Orders:             Depression screen negative  (In-House)           847.0 Neck strain            S13.4XXA    Sprain of ligaments of cervical spine, initial encounter        Other Orders:           2028F   Foot examination performed (includes examination through visual inspection, sensory exam with monofi  (In-House)

## 2021-05-18 NOTE — PROGRESS NOTES
Octavio MercadoJasper 1960     Office/Outpatient Visit    Visit Date: Mon, Jan 29, 2018 08:43 am    Provider: Jaden Carlson MD (Assistant: Brianna Lauren MA)    Location: [facility]        Electronically signed by Jaden Carlson MD on  01/29/2018 09:34:48 AM                             SUBJECTIVE:        CC:     Hardeep is a 58 year old White male.  This is a follow-up visit.  med refills         HPI:         Patient to be evaluated for hypertension.  Hardeep does not check his blood pressure other than at his clinic appointments.  He is tolerating the medication well without side effects.  Compliance with treatment has been good.          With regard to the type 2 diabetes, compliance with treatment has been good.  He specifically denies associated symptoms, including nocturia, polydipsia and polyuria.  He reports home blood glucose readings have been fairly good, with average fasting glucoses running in the 120-150 mg/dL range. He checks his glucose 1 to 2 times daily.      ROS:     CONSTITUTIONAL:  Negative for chills, fatigue, fever and weight change.      CARDIOVASCULAR:  Negative for chest pain, orthopnea, paroxysmal nocturnal dyspnea and pedal edema.      RESPIRATORY:  Negative for dyspnea and cough.      GASTROINTESTINAL:  Negative for abdominal pain, heartburn, constipation, diarrhea, and stool changes.      GENITOURINARY:  Negative for dysuria and polyuria.      PSYCHIATRIC:  Negative for anxiety and depression.          PMH/FMH/SH:     Last Reviewed on 3/29/2017 09:20 AM by Jaden Carlson    Past Medical History:         Positive for    fell off ladder 2014;         Surgical History:         Cholecystectomy: 11/00; Procedures: colonoscopy 3/07         Family History:     Father: Myocardial Infarction;  Esophageal Cancer     Positive for Pancreatric Cancer ( pat. GF; mat. aunt -- x2 ).      Positive for Type 2 Diabetes ( mat. GF; pat. uncle; pat. aunt ).      Positive for  Alcoholism ( father ).          Social History:  for [employer]         Tobacco/Alcohol/Supplements:     Last Reviewed on 8/15/2017 11:34 AM by Garrison Daniel    Tobacco: He has never smoked.          Alcohol: Frequency:    rare;         Substance Abuse History:     Last Reviewed on 3/29/2017 09:20 AM by Jaden Carlson        Mental Health History:     Last Reviewed on 3/29/2017 09:20 AM by Jadne Carlson        Communicable Diseases (eg STDs):     Last Reviewed on 3/29/2017 09:20 AM by Jaden Carlson            Allergies:     Last Reviewed on 8/15/2017 11:33 AM by Garrison Daniel      No Known Drug Allergies.         Current Medications:     Last Reviewed on 8/15/2017 11:33 AM by Garrison Daniel    Lisinopril 10mg Tablet Take 1 tablet(s) by mouth daily     Metformin HCl 500mg Tablet Take 1 tablet(s) by mouth bid         OBJECTIVE:        Vitals:         Current: 1/29/2018 8:46:09 AM    Ht:  6 ft, 3 in;  Wt: 213.4 lbs;  BMI: 26.7    T: 98.7 F (oral);  BP: 122/66 mm Hg (right arm, sitting);  P: 76 bpm (right arm (BP Cuff), sitting);  sCr: 0.85 mg/dL;  GFR: 104.92        Exams:     PHYSICAL EXAM:     GENERAL:  well developed and nourished; appropriately groomed; in no apparent distress;     EYES: Nonicteric and with unremarkable lids, iris and pupils;     E/N/T:  normal EACs, TMs, nasal/oral mucosa, teeth, gingiva, and oropharynx;     NECK: carotid exam reveals no bruits;     RESPIRATORY: normal respiratory rate and pattern with no distress; normal breath sounds with no rales, rhonchi, wheezes or rubs;     CARDIOVASCULAR: normal rate; rhythm is regular;  no systolic murmur;     LYMPHATIC: no enlargement of cervical or facial nodes;     MUSCULOSKELETAL: normal overall tone No pedal edema.;     NEUROLOGIC: No lateralizing deficit.;     PSYCHIATRIC:  appropriate affect and demeanor; normal speech pattern; grossly normal memory;     Left foot exam    Protective sensation using  Monofilament test: NORMAL sensation. Patient detects .07 grams of force which is considered normal.    Vascular status: normal peripheral vascular exam with palpable dorsal pedal and posterior tibal pulses and brisk digital capillary refill    Skin is intact without sores or ulcers    Right foot exam    Protective sensation using Monofilament test: NORMAL sensation. Patient detects .07 grams of force which is considered normal.    Vascular status: normal peripheral vascular exam with palpable dorsal pedal and posterior tibal pulses and brisk digital capillary refill    Skin is intact without sores or ulcers         ASSESSMENT           401.1   I10  Hypertension              DDx:     250.00   E11.9  Type 2 diabetes              DDx:         ORDERS:         Meds Prescribed:       Refill of: Lisinopril 10mg Tablet Take 1 tablet(s) by mouth daily  #90 (Ninety) tablet(s) Refills: 0       Refill of: Metformin HCl 500mg Tablet Take 1 tablet(s) by mouth bid  #180 (One [place] and Eighty) tablet(s) Refills: 0         Other Orders:       2028F  Foot examination performed (includes examination through visual inspection, sensory exam with monofi  (In-House)                   PLAN:          Hypertension           Prescriptions:       Refill of: Lisinopril 10mg Tablet Take 1 tablet(s) by mouth daily  #90 (Ninety) tablet(s) Refills: 0          Type 2 diabetes           Prescriptions:       Refill of: Metformin HCl 500mg Tablet Take 1 tablet(s) by mouth bid  #180 (One [place] and Eighty) tablet(s) Refills: 0             Other Orders:       2028F  Foot examination performed (includes examination through visual inspection, sensory exam with monofi  (In-House)           CHARGE CAPTURE           **Please note: ICD descriptions below are intended for billing purposes only and may not represent clinical diagnoses**        Primary Diagnosis:         401.1 Hypertension            I10    Essential (primary) hypertension               Orders:          [id number]   Office/outpatient visit; established patient, level 4  (In-House)           250.00 Type 2 diabetes            E11.9    Type 2 diabetes mellitus without complications        Other Orders:           2028F   Foot examination performed (includes examination through visual inspection, sensory exam with monofi  (In-House)

## 2021-05-18 NOTE — PROGRESS NOTES
Octavio Mercado  1960     Office/Outpatient Visit    Visit Date: Fri, Jun 26, 2020 08:33 am    Provider: Jaden Carlson MD (Assistant: Spurling, Sarah C, MA)    Location: [facility]        Electronically signed by Jaden Carlson MD on  06/26/2020 08:58:48 AM                             Subjective:        CC: Hardeep is a 60 year old White male.  This is a follow-up visit.  Routine. (no way to check his temp or bp) Phone call: [phone]         HPI: TELEMEDICINE VISIT:    - Patient consented to telemedicine visit and billing    - Persons on the call include:  myself, patient,     - The call was conducted via:Navidog phone                  Patient to be evaluated for type 2 diabetes mellitus without complications.  Compliance with treatment has been good; he takes his medication as directed and follows up as directed.  Even just a small piece of bread raises his BS quite a bit, so trying to avoid.Usually 115-120, rarely up to 150.              Essential (primary) hypertension details; compliance with treatment has been good; he takes his medication as directed and follows up as directed.  CDL exam 2 weeks ago and BP looked good.     ROS:     CONSTITUTIONAL:  Negative for chills, fatigue and fever.      CARDIOVASCULAR:  Negative for chest pain, orthopnea, paroxysmal nocturnal dyspnea and pedal edema.      RESPIRATORY:  Negative for dyspnea and cough.      GASTROINTESTINAL:  Negative for abdominal pain, heartburn, constipation, diarrhea, and stool changes.      GENITOURINARY:  Negative for dysuria and polyuria.      PSYCHIATRIC:  Negative for anxiety and depression.          Past Medical History / Family History / Social History:         Last Reviewed on 6/26/2020 08:55 AM by Jaden Carlson    Past Medical History:         Positive for    fell off ladder 2014;         PREVENTIVE HEALTH MAINTENANCE             COLORECTAL CANCER SCREENING: Up to date (colonoscopy q10y;  sigmoidoscopy q5y; Cologuard q3y) was last done 05/19/17, Results are in chart         Surgical History:         Cholecystectomy: 11/00;         Family History:     Father: Myocardial Infarction;  Esophageal Cancer     Positive for Pancreatric Cancer ( pat. GF; mat. aunt -- x2 ).      Positive for Type 2 Diabetes ( mat. GF; pat. uncle; pat. aunt ).      Positive for Alcoholism ( father ).          Social History: [place] [employer]         Tobacco/Alcohol/Supplements:     Last Reviewed on 6/26/2020 08:33 AM by Spurling, Sarah C    Tobacco: He has never smoked.          Alcohol: Frequency:    rare;         Substance Abuse History:     Last Reviewed on 3/29/2017 09:20 AM by Jaden Carlson        Mental Health History:     Last Reviewed on 3/29/2017 09:20 AM by Jaden Carlson        Communicable Diseases (eg STDs):     Last Reviewed on 3/29/2017 09:20 AM by Jaden Carlson        Allergies:     Last Reviewed on 6/26/2020 08:33 AM by Spurling, Sarah C    No Known Allergies.        Current Medications:     Last Reviewed on 6/26/2020 08:33 AM by Spurling, Sarah C    metFORMIN 500 mg oral Tablet, Extended Release 24 hr [TAKE 2 TABLETS BY MOUTH TWICE DAILY WITH MEALS]    Lisinopril 10 mg oral tablet [Take 1 tablet(s) by mouth daily]        Objective:        Exams:     Telehealth visit via telephone.  Patient's voice sounded strong.  There is no evidence of respiratory issues, no cough or congestion audible.  Mental health seem to be good.  Had good insight into the coronavirus issues.                Assessment:         E11.9   Type 2 diabetes mellitus without complications       I10   Essential (primary) hypertension           ORDERS:         Meds Prescribed:       [Refilled] Lisinopril 10 mg oral tablet [Take 1 tablet(s) by mouth daily], #90 (ninety) tablets, Refills: 1 (one)       [Refilled] metFORMIN 500 mg oral Tablet, Extended Release 24 hr [TAKE 2 TABLETS BY MOUTH TWICE DAILY WITH MEALS], #360  (three hundred and sixty) tablets, Refills: 1 (one)         Lab Orders:       FUTURE  Future order to be done at patients convenience  (Send-Out)            [id number]  [address] CMP A1C LIPID AND MICRO ALBUM CR RATIO: [id number]  (Send-Out)                      Plan:         Type 2 diabetes mellitus without complicationsHe is doing quite well in general.  Continue on his current medical regimen.  We will get him to come in for labs.  Encouraged him to be sure to get a flu shot this fall.        FOLLOW-UP TESTING #1: FOLLOW-UP LABORATORY:  Labs to be scheduled in the future include Diabetes Panel 2;CMP, A1C, Lipid, Microalbumin:Creatinine Ratio.            Prescriptions:       [Refilled] metFORMIN 500 mg oral Tablet, Extended Release 24 hr [TAKE 2 TABLETS BY MOUTH TWICE DAILY WITH MEALS], #360 (three hundred and sixty) tablets, Refills: 1 (one)           Orders:       FUTURE  Future order to be done at patients convenience  (Send-Out)            [id number]  [address] CMP A1C LIPID AND MICRO ALBUM CR RATIO: [id number]  (Send-Out)              Essential (primary) hypertension    Telehealth: Verbal consent obtained for visit to occur via phone call; Staff, other than provider, present during telephone visit include none; Total time spent was 12 minutes; [id number]--Jkasufeev E/M 11-20 minutes           Prescriptions:       [Refilled] Lisinopril 10 mg oral tablet [Take 1 tablet(s) by mouth daily], #90 (ninety) tablets, Refills: 1 (one)             Other Patient Education Handouts:     Dragon transcription disclaimer:        Much of this encounter note is an electronic transcription/translation of spoken language to printed text.  The electronic translation of spoken language may permit erroneous, or at times, nonsensical words or phrases to be inadvertently transcribed.  Although I have reviewed the note for such errors, some may still exist.        Patient Recommendations:        For  Type 2  diabetes mellitus without complications:            The following laboratory testing has been ordered:             Charge Capture:         Primary Diagnosis:     E11.9  Type 2 diabetes mellitus without complications     I10  Essential (primary) hypertension           Orders:      [id number]  Phys/QHP telephone evaluation 11-20 minutes  (In-House)

## 2021-06-16 ENCOUNTER — TELEPHONE (OUTPATIENT)
Dept: FAMILY MEDICINE CLINIC | Age: 61
End: 2021-06-16

## 2021-06-16 DIAGNOSIS — Z79.4 TYPE 2 DIABETES MELLITUS WITH HYPOGLYCEMIA WITHOUT COMA, WITH LONG-TERM CURRENT USE OF INSULIN: Primary | ICD-10-CM

## 2021-06-16 DIAGNOSIS — Z12.5 SCREENING FOR MALIGNANT NEOPLASM OF PROSTATE: ICD-10-CM

## 2021-06-16 DIAGNOSIS — E11.649 TYPE 2 DIABETES MELLITUS WITH HYPOGLYCEMIA WITHOUT COMA, WITH LONG-TERM CURRENT USE OF INSULIN: Primary | ICD-10-CM

## 2021-06-16 DIAGNOSIS — E78.2 MIXED HYPERLIPIDEMIA: ICD-10-CM

## 2021-06-16 NOTE — TELEPHONE ENCOUNTER
Caller: Octavio Mercado    Relationship: Self    Best call back number: [phone]  What is the best time to reach you: ANYTIME    Who are you requesting to speak with (clinical staff, provider,  specific staff member): CLINICAL STAFF    Do you know the name of the person who called: PATIENT    What was the call regarding: NEEDS TO SCHEDULE BLOOD WORK BEFORE THE APPOINTMENT ON 7/6/2021    Do you require a callback: YES

## 2021-06-30 ENCOUNTER — TELEPHONE (OUTPATIENT)
Dept: FAMILY MEDICINE CLINIC | Age: 61
End: 2021-06-30

## 2021-06-30 DIAGNOSIS — Z79.4 TYPE 2 DIABETES MELLITUS WITH HYPOGLYCEMIA WITHOUT COMA, WITH LONG-TERM CURRENT USE OF INSULIN: Primary | ICD-10-CM

## 2021-06-30 DIAGNOSIS — E78.2 MIXED HYPERLIPIDEMIA: ICD-10-CM

## 2021-06-30 DIAGNOSIS — E11.649 TYPE 2 DIABETES MELLITUS WITH HYPOGLYCEMIA WITHOUT COMA, WITH LONG-TERM CURRENT USE OF INSULIN: Primary | ICD-10-CM

## 2021-06-30 NOTE — TELEPHONE ENCOUNTER
SHAMEKA    Caller: Octavio Mercado    Relationship: Self    Best call back number: [phone]    What orders are you requesting (i.e. lab or imaging): LABS    In what timeframe would the patient need to come in: THIS WEEK    Where will you receive your lab/imaging services: IN OFFICE    Additional notes: PATIENT STOPPED BY TO GET LABS DRAWN THIS MORNING AND HE WAS INFORMED THAT HE WAS NOT IN THE SYSTEM.    HE NEEDS TO GET HIS LABS SCHEDULED.

## 2021-07-01 ENCOUNTER — LAB (OUTPATIENT)
Dept: LAB | Facility: HOSPITAL | Age: 61
End: 2021-07-01

## 2021-07-01 VITALS
HEIGHT: 75 IN | BODY MASS INDEX: 26.11 KG/M2 | DIASTOLIC BLOOD PRESSURE: 70 MMHG | HEART RATE: 72 BPM | TEMPERATURE: 98 F | SYSTOLIC BLOOD PRESSURE: 115 MMHG | WEIGHT: 210 LBS

## 2021-07-01 VITALS
HEIGHT: 75 IN | BODY MASS INDEX: 26.21 KG/M2 | WEIGHT: 210.8 LBS | HEART RATE: 68 BPM | DIASTOLIC BLOOD PRESSURE: 76 MMHG | SYSTOLIC BLOOD PRESSURE: 133 MMHG | TEMPERATURE: 98.2 F

## 2021-07-01 VITALS
HEIGHT: 75 IN | SYSTOLIC BLOOD PRESSURE: 126 MMHG | HEART RATE: 73 BPM | BODY MASS INDEX: 25.59 KG/M2 | WEIGHT: 205.8 LBS | TEMPERATURE: 98.4 F | DIASTOLIC BLOOD PRESSURE: 76 MMHG

## 2021-07-01 VITALS
SYSTOLIC BLOOD PRESSURE: 122 MMHG | HEIGHT: 75 IN | DIASTOLIC BLOOD PRESSURE: 66 MMHG | WEIGHT: 213.4 LBS | BODY MASS INDEX: 26.53 KG/M2 | HEART RATE: 76 BPM | TEMPERATURE: 98.7 F

## 2021-07-01 DIAGNOSIS — Z79.4 TYPE 2 DIABETES MELLITUS WITH HYPOGLYCEMIA WITHOUT COMA, WITH LONG-TERM CURRENT USE OF INSULIN (HCC): ICD-10-CM

## 2021-07-01 DIAGNOSIS — E78.2 MIXED HYPERLIPIDEMIA: ICD-10-CM

## 2021-07-01 DIAGNOSIS — E11.649 TYPE 2 DIABETES MELLITUS WITH HYPOGLYCEMIA WITHOUT COMA, WITH LONG-TERM CURRENT USE OF INSULIN (HCC): ICD-10-CM

## 2021-07-01 LAB
ALBUMIN SERPL-MCNC: 4.7 G/DL (ref 3.5–5.2)
ALBUMIN UR-MCNC: <1.2 MG/DL
ALBUMIN/GLOB SERPL: 2 G/DL
ALP SERPL-CCNC: 53 U/L (ref 39–117)
ALT SERPL W P-5'-P-CCNC: 10 U/L (ref 1–41)
ANION GAP SERPL CALCULATED.3IONS-SCNC: 7.3 MMOL/L (ref 5–15)
AST SERPL-CCNC: 15 U/L (ref 1–40)
BILIRUB SERPL-MCNC: 0.7 MG/DL (ref 0–1.2)
BUN SERPL-MCNC: 13 MG/DL (ref 8–23)
BUN/CREAT SERPL: 14.8 (ref 7–25)
CALCIUM SPEC-SCNC: 9.3 MG/DL (ref 8.6–10.5)
CHLORIDE SERPL-SCNC: 103 MMOL/L (ref 98–107)
CO2 SERPL-SCNC: 26.7 MMOL/L (ref 22–29)
CREAT SERPL-MCNC: 0.88 MG/DL (ref 0.76–1.27)
CREAT UR-MCNC: 106.4 MG/DL
GFR SERPL CREATININE-BSD FRML MDRD: 88 ML/MIN/1.73
GLOBULIN UR ELPH-MCNC: 2.4 GM/DL
GLUCOSE SERPL-MCNC: 163 MG/DL (ref 65–99)
HBA1C MFR BLD: 6.4 % (ref 4.8–5.6)
MICROALBUMIN/CREAT UR: NORMAL MG/G{CREAT}
POTASSIUM SERPL-SCNC: 4.5 MMOL/L (ref 3.5–5.2)
PROT SERPL-MCNC: 7.1 G/DL (ref 6–8.5)
SODIUM SERPL-SCNC: 137 MMOL/L (ref 136–145)

## 2021-07-01 PROCEDURE — 83036 HEMOGLOBIN GLYCOSYLATED A1C: CPT

## 2021-07-01 PROCEDURE — 82043 UR ALBUMIN QUANTITATIVE: CPT

## 2021-07-01 PROCEDURE — 36415 COLL VENOUS BLD VENIPUNCTURE: CPT

## 2021-07-01 PROCEDURE — 80053 COMPREHEN METABOLIC PANEL: CPT

## 2021-07-01 PROCEDURE — 82570 ASSAY OF URINE CREATININE: CPT

## 2021-07-02 VITALS
SYSTOLIC BLOOD PRESSURE: 132 MMHG | DIASTOLIC BLOOD PRESSURE: 68 MMHG | WEIGHT: 211 LBS | BODY MASS INDEX: 26.24 KG/M2 | HEART RATE: 72 BPM | TEMPERATURE: 96.7 F | HEIGHT: 75 IN

## 2021-07-02 VITALS
HEIGHT: 75 IN | DIASTOLIC BLOOD PRESSURE: 64 MMHG | WEIGHT: 217 LBS | SYSTOLIC BLOOD PRESSURE: 131 MMHG | BODY MASS INDEX: 26.98 KG/M2 | HEART RATE: 69 BPM | TEMPERATURE: 98.1 F

## 2021-07-06 ENCOUNTER — OFFICE VISIT (OUTPATIENT)
Dept: FAMILY MEDICINE CLINIC | Age: 61
End: 2021-07-06

## 2021-07-06 VITALS
HEART RATE: 63 BPM | HEIGHT: 75 IN | TEMPERATURE: 97.1 F | WEIGHT: 208.8 LBS | DIASTOLIC BLOOD PRESSURE: 71 MMHG | BODY MASS INDEX: 25.96 KG/M2 | SYSTOLIC BLOOD PRESSURE: 122 MMHG

## 2021-07-06 DIAGNOSIS — I10 ESSENTIAL HYPERTENSION: ICD-10-CM

## 2021-07-06 DIAGNOSIS — E11.9 TYPE 2 DIABETES MELLITUS WITHOUT COMPLICATION, WITHOUT LONG-TERM CURRENT USE OF INSULIN (HCC): Primary | ICD-10-CM

## 2021-07-06 DIAGNOSIS — Z12.5 SCREENING PSA (PROSTATE SPECIFIC ANTIGEN): ICD-10-CM

## 2021-07-06 DIAGNOSIS — M25.512 CHRONIC LEFT SHOULDER PAIN: ICD-10-CM

## 2021-07-06 DIAGNOSIS — G89.29 CHRONIC LEFT SHOULDER PAIN: ICD-10-CM

## 2021-07-06 PROCEDURE — 99214 OFFICE O/P EST MOD 30 MIN: CPT | Performed by: FAMILY MEDICINE

## 2021-07-06 RX ORDER — LISINOPRIL 10 MG/1
10 TABLET ORAL DAILY
Qty: 90 TABLET | Refills: 1 | Status: SHIPPED | OUTPATIENT
Start: 2021-07-06 | End: 2022-01-17

## 2021-07-06 RX ORDER — METFORMIN HYDROCHLORIDE 500 MG/1
1000 TABLET, EXTENDED RELEASE ORAL 2 TIMES DAILY WITH MEALS
Qty: 360 TABLET | Refills: 1 | Status: SHIPPED | OUTPATIENT
Start: 2021-07-06 | End: 2022-01-17

## 2021-07-06 RX ORDER — LISINOPRIL 10 MG/1
10 TABLET ORAL DAILY
COMMUNITY
Start: 2021-04-10 | End: 2021-07-06 | Stop reason: SDUPTHER

## 2021-07-06 RX ORDER — CYCLOBENZAPRINE HCL 10 MG
TABLET ORAL
COMMUNITY
End: 2022-02-07

## 2021-07-06 RX ORDER — CYCLOBENZAPRINE HCL 10 MG
10 TABLET ORAL 2 TIMES DAILY PRN
Qty: 90 TABLET | Refills: 1 | Status: CANCELLED | OUTPATIENT
Start: 2021-07-06

## 2021-07-06 RX ORDER — METFORMIN HYDROCHLORIDE 500 MG/1
1000 TABLET, EXTENDED RELEASE ORAL 2 TIMES DAILY WITH MEALS
COMMUNITY
Start: 2021-04-10 | End: 2021-07-06 | Stop reason: SDUPTHER

## 2021-07-06 NOTE — ASSESSMENT & PLAN NOTE
He is on a check with his company doctor to see if this might be a Workmen's Compensation issue.  If not totally would be happy who referred him on for further evaluation

## 2021-07-06 NOTE — PROGRESS NOTES
"Chief Complaint  Hypertension, Diabetes, and Med Refill    Subjective          Octavio Mercado presents to [facility]  History of Present Illness  Hardeep is here for follow-up on his chronic health conditions.  His diabetes is doing fairly well he brings with him a log of blood sugars mostly running in the 1 30-1 50 range he did already have lab work which shows his hemoglobin A1c to be excellent.  He does report noticing some discomfort in the left shoulder over the past 3 months.  Also noticed when he moves external rotating fashion that causes pain to radiate down into the arm.  Review of Systems   Constitutional: Negative for chills, fatigue and fever.   Respiratory: Negative for chest tightness, shortness of breath and wheezing.    Cardiovascular: Negative for chest pain and palpitations.   Gastrointestinal: Negative for constipation, diarrhea, nausea and vomiting.   Genitourinary: Negative for dysuria, hematuria and urgency.   Neurological: Negative for weakness and headache.   Psychiatric/Behavioral: Negative for hallucinations.        Objective   Vital Signs:   /71   Pulse 63   Temp 97.1 °F (36.2 °C) (Oral)   Ht 190.5 cm (75\")   Wt 94.7 kg (208 lb 12.8 oz)   BMI 26.10 kg/m²     Physical Exam  Constitutional:       Appearance: Normal appearance.   Neck:      Vascular: No carotid bruit.   Cardiovascular:      Rate and Rhythm: Normal rate and regular rhythm.      Heart sounds: Normal heart sounds. No murmur heard.     Pulmonary:      Effort: No respiratory distress.      Breath sounds: No wheezing or rales.   Musculoskeletal:      Right lower leg: No edema.      Left lower leg: No edema.      Right foot: No deformity.      Left foot: No deformity.      Comments: Left shoulder has pretty good range of motion but quite a bit of discomfort with apprehension testing.   Feet:      Right foot:      Protective Sensation: 5 sites tested. 5 sites sensed.      Skin " integrity: No ulcer or callus.      Toenail Condition: Right toenails are normal.      Left foot:      Protective Sensation: 5 sites tested. 5 sites sensed.      Skin integrity: No ulcer or callus.      Toenail Condition: Left toenails are normal.   Lymphadenopathy:      Cervical: No cervical adenopathy.   Neurological:      General: No focal deficit present.      Mental Status: He is alert.   Psychiatric:         Attention and Perception: Attention normal.         Mood and Affect: Mood normal.         Speech: Speech normal.        Result Review :   The following data was reviewed by: Jaden Carlson MD on 07/06/2021:  PSA Screen (07/06/2021 08:25)  Comprehensive Metabolic Panel (07/06/2021 08:25)  Hemoglobin A1c (07/06/2021 08:25)                Assessment and Plan    Diagnoses and all orders for this visit:    1. Type 2 diabetes mellitus without complication, without long-term current use of insulin (CMS/[facility]) (Primary)  Comments:  His diabetes is doing pretty well.  Continue on current regimen  Assessment & Plan:  He has eye doctor appt in Sept    Orders:  -     Hemoglobin A1c; Future    2. Essential hypertension  Assessment & Plan:  Blood pressure looks good today.  Continue on current regimen    Orders:  -     Hemoglobin A1c; Future  -     Comprehensive Metabolic Panel; Future    3. Chronic left shoulder pain  Assessment & Plan:  He is on a check with his company doctor to see if this might be a Workmen's Compensation issue.  If not totally would be happy who referred him on for further evaluation      4. Screening PSA (prostate specific antigen)  -     PSA Screen; Future    Other orders  -     Cancel: cyclobenzaprine (FLEXERIL) 10 MG tablet; Take 1 tablet by mouth 2 (Two) Times a Day As Needed for Muscle Spasms.  Dispense: 90 tablet; Refill: 1  -     lisinopril (PRINIVIL,ZESTRIL) 10 MG tablet; Take 1 tablet by mouth Daily.  Dispense: 90 tablet; Refill: 1  -     metFORMIN ER (GLUCOPHAGE-XR) 500 MG 24 hr  tablet; Take 2 tablets by mouth 2 (Two) Times a Day With Meals for 90 days.  Dispense: 360 tablet; Refill: 1      Follow Up   No follow-ups on file.  Patient was given instructions and counseling regarding his condition or for health maintenance advice. Please see specific information pulled into the AVS if appropriate.

## 2021-07-12 ENCOUNTER — TELEPHONE (OUTPATIENT)
Dept: FAMILY MEDICINE CLINIC | Age: 61
End: 2021-07-12

## 2021-12-02 ENCOUNTER — TELEPHONE (OUTPATIENT)
Dept: FAMILY MEDICINE CLINIC | Age: 61
End: 2021-12-02

## 2021-12-02 NOTE — TELEPHONE ENCOUNTER
Caller: Octavio Mercado    Relationship: Self    Best call back number: [phone]    What orders are you requesting (i.e. lab or imaging): BLOOD WORK FOR APPOINTMENT    In what timeframe would the patient need to come in: December 28TH, LAST WEEK OF DECEMBER

## 2021-12-27 ENCOUNTER — LAB (OUTPATIENT)
Dept: LAB | Facility: HOSPITAL | Age: 61
End: 2021-12-27

## 2021-12-27 DIAGNOSIS — Z12.5 SCREENING PSA (PROSTATE SPECIFIC ANTIGEN): ICD-10-CM

## 2021-12-27 DIAGNOSIS — E11.9 TYPE 2 DIABETES MELLITUS WITHOUT COMPLICATION, WITHOUT LONG-TERM CURRENT USE OF INSULIN (HCC): ICD-10-CM

## 2021-12-27 DIAGNOSIS — I10 ESSENTIAL HYPERTENSION: ICD-10-CM

## 2021-12-27 LAB
ALBUMIN SERPL-MCNC: 4.4 G/DL (ref 3.5–5.2)
ALBUMIN/GLOB SERPL: 1.7 G/DL
ALP SERPL-CCNC: 58 U/L (ref 39–117)
ALT SERPL W P-5'-P-CCNC: 12 U/L (ref 1–41)
ANION GAP SERPL CALCULATED.3IONS-SCNC: 8 MMOL/L (ref 5–15)
AST SERPL-CCNC: 7 U/L (ref 1–40)
BILIRUB SERPL-MCNC: 0.4 MG/DL (ref 0–1.2)
BUN SERPL-MCNC: 15 MG/DL (ref 8–23)
BUN/CREAT SERPL: 17.4 (ref 7–25)
CALCIUM SPEC-SCNC: 9.3 MG/DL (ref 8.6–10.5)
CHLORIDE SERPL-SCNC: 104 MMOL/L (ref 98–107)
CO2 SERPL-SCNC: 28 MMOL/L (ref 22–29)
CREAT SERPL-MCNC: 0.86 MG/DL (ref 0.76–1.27)
GFR SERPL CREATININE-BSD FRML MDRD: 91 ML/MIN/1.73
GLOBULIN UR ELPH-MCNC: 2.6 GM/DL
GLUCOSE SERPL-MCNC: 178 MG/DL (ref 65–99)
HBA1C MFR BLD: 7.16 % (ref 4.8–5.6)
POTASSIUM SERPL-SCNC: 4 MMOL/L (ref 3.5–5.2)
PROT SERPL-MCNC: 7 G/DL (ref 6–8.5)
PSA SERPL-MCNC: 0.98 NG/ML (ref 0–4)
SODIUM SERPL-SCNC: 140 MMOL/L (ref 136–145)

## 2021-12-27 PROCEDURE — 80053 COMPREHEN METABOLIC PANEL: CPT

## 2021-12-27 PROCEDURE — 83036 HEMOGLOBIN GLYCOSYLATED A1C: CPT

## 2021-12-27 PROCEDURE — G0103 PSA SCREENING: HCPCS

## 2021-12-27 PROCEDURE — 36415 COLL VENOUS BLD VENIPUNCTURE: CPT

## 2022-01-17 RX ORDER — LISINOPRIL 10 MG/1
10 TABLET ORAL DAILY
Qty: 90 TABLET | Refills: 1 | Status: SHIPPED | OUTPATIENT
Start: 2022-01-17 | End: 2022-06-30

## 2022-01-17 RX ORDER — METFORMIN HYDROCHLORIDE 500 MG/1
1000 TABLET, EXTENDED RELEASE ORAL 2 TIMES DAILY WITH MEALS
Qty: 360 TABLET | Refills: 1 | Status: SHIPPED | OUTPATIENT
Start: 2022-01-17 | End: 2022-06-30

## 2022-02-07 ENCOUNTER — OFFICE VISIT (OUTPATIENT)
Dept: FAMILY MEDICINE CLINIC | Age: 62
End: 2022-02-07

## 2022-02-07 VITALS
SYSTOLIC BLOOD PRESSURE: 136 MMHG | HEART RATE: 62 BPM | HEIGHT: 75 IN | OXYGEN SATURATION: 99 % | WEIGHT: 209.4 LBS | BODY MASS INDEX: 26.04 KG/M2 | TEMPERATURE: 98.1 F | DIASTOLIC BLOOD PRESSURE: 76 MMHG

## 2022-02-07 DIAGNOSIS — E11.9 TYPE 2 DIABETES MELLITUS WITHOUT COMPLICATION, WITHOUT LONG-TERM CURRENT USE OF INSULIN: Primary | ICD-10-CM

## 2022-02-07 DIAGNOSIS — Z72.9 PROBLEM RELATED TO LIFESTYLE: ICD-10-CM

## 2022-02-07 DIAGNOSIS — F51.01 PRIMARY INSOMNIA: ICD-10-CM

## 2022-02-07 DIAGNOSIS — I10 PRIMARY HYPERTENSION: ICD-10-CM

## 2022-02-07 PROBLEM — M54.2 NECK PAIN: Status: ACTIVE | Noted: 2019-09-16

## 2022-02-07 PROBLEM — M50.30 DDD (DEGENERATIVE DISC DISEASE), CERVICAL: Status: ACTIVE | Noted: 2022-02-07

## 2022-02-07 PROBLEM — E07.9 THYROID DISORDER: Status: ACTIVE | Noted: 2021-07-06

## 2022-02-07 PROCEDURE — 99214 OFFICE O/P EST MOD 30 MIN: CPT | Performed by: FAMILY MEDICINE

## 2022-02-07 NOTE — PROGRESS NOTES
Chief Complaint  Hypertension (F/U ) and Diabetes    Subjective          Octavio Mercado presents to [facility]  History of Present Illness  Here today follow-up on his chronic health issues including diabetes and hypertension.  Tolerating his medications well.  Checks his blood sugars at home generally run between 130 and 160.  He travels a lot and says is difficult to follow a good diet on the road.  Also holidays recently and grandchild had a birthday recently.  He had labs in December which we reviewed.  Hemoglobin A1c is a little bit higher than it traditionally had been.  He thinks that he can tighten his diet little more possibly avoid addition of more medication.  He does state that he has taken the Covid booster did not bring his vaccine card with him today.  He is also reporting's recent issues with early morning awakening.  Says wakes up at about 4 AM and his mind kicks into gear making it difficult to fall back to sleep.  Talked about the preference for good sleep hygiene as opposed to medications.  However, told him if he tries implementing the sleep hygiene recommendations and still finds it difficult to maintain sleep through the night we could add some trazodone.  He says that he has had abnormal thyroid tests in the past and would like to follow-up on that at next blood draw    Current Outpatient Medications   Medication Sig Dispense Refill   • lisinopril (PRINIVIL,ZESTRIL) 10 MG tablet TAKE 1 TABLET BY MOUTH DAILY. 90 tablet 1   • metFORMIN ER (GLUCOPHAGE-XR) 500 MG 24 hr tablet TAKE 2 TABLETS BY MOUTH 2 (TWO) TIMES A DAY WITH MEALS FOR 90 DAYS. 360 tablet 1     No current facility-administered medications for this visit.       Review of Systems   Constitutional: Negative for chills, fatigue and fever.   Respiratory: Negative for chest tightness, shortness of breath and wheezing.    Cardiovascular: Negative for chest pain and palpitations.  "  Gastrointestinal: Negative for constipation, diarrhea, nausea and vomiting.   Genitourinary: Negative for dysuria, hematuria and urgency.   Neurological: Negative for weakness and headache.   Psychiatric/Behavioral: Negative for hallucinations.            Objective   Vital Signs:   /76 (BP Location: Left arm, Patient Position: Sitting)   Pulse 62   Temp 98.1 °F (36.7 °C)   Ht 190.5 cm (75\")   Wt 95 kg (209 lb 6.4 oz)   SpO2 99%   BMI 26.17 kg/m²     Physical Exam  Constitutional:       Appearance: Normal appearance.   Neck:      Vascular: No carotid bruit.   Cardiovascular:      Rate and Rhythm: Normal rate and regular rhythm.      Heart sounds: Normal heart sounds. No murmur heard.      Pulmonary:      Effort: No respiratory distress.      Breath sounds: No wheezing or rales.   Musculoskeletal:      Right lower leg: No edema.      Left lower leg: No edema.      Right foot: No deformity.      Left foot: No deformity.   Feet:      Right foot:      Protective Sensation: 5 sites tested. 5 sites sensed.      Skin integrity: No ulcer or callus.      Toenail Condition: Right toenails are normal.      Left foot:      Protective Sensation: 5 sites tested. 5 sites sensed.      Skin integrity: No ulcer or callus.      Toenail Condition: Left toenails are normal.      Comments: Dorsalis pedis posterior tibial pulses are good bilateral and feet are without concerning calluses, ulcerations, or other deformities. Sensation intact to monofilament testing.     Lymphadenopathy:      Cervical: No cervical adenopathy.   Neurological:      General: No focal deficit present.      Mental Status: He is alert.   Psychiatric:         Attention and Perception: Attention normal.         Mood and Affect: Mood normal.         Speech: Speech normal.            Result Review :   The following data was reviewed by: Jaden Carlson MD on 02/07/2022:  PSA Screen (12/27/2021 11:12)  Comprehensive Metabolic Panel (12/27/2021 " 11:12)  Hemoglobin A1c (12/27/2021 07:33)                  Assessment and Plan    Diagnoses and all orders for this visit:    1. Type 2 diabetes mellitus without complication, without long-term current use of insulin (HCC) (Primary)  Comments:  He is going to redouble his efforts at diet.  Hold off on medication changes at this time.  Get labs prior to his follow-up appointment in August  Orders:  -     Microalbumin / Creatinine Urine Ratio - Urine, Clean Catch; Future  -     Hemoglobin A1c; Future  -     Lipid Panel; Future  -     Comprehensive Metabolic Panel; Future  -     TSH; Future    2. Primary hypertension  Comments:  Blood pressure looks okay continue on current regimen  Orders:  -     Lipid Panel; Future  -     Comprehensive Metabolic Panel; Future  -     CBC Auto Differential; Future    3. Problem related to lifestyle  Comments:  We will check hep C antibody at his next blood draw  Orders:  -     Hepatitis C Antibody; Future    4. Primary insomnia  Comments:  We will try improving sleep hygiene.  If continues to be an issue though he can call back in for prescription of trazodone    We also discussed shingles vaccine and Pneumovax.  Recommend he consider getting those through the pharmacy.        Follow Up   No follow-ups on file.  Patient was given instructions and counseling regarding his condition or for health maintenance advice. Please see specific information pulled into the AVS if appropriate.

## 2022-05-13 ENCOUNTER — TELEPHONE (OUTPATIENT)
Dept: FAMILY MEDICINE CLINIC | Age: 62
End: 2022-05-13

## 2022-05-13 NOTE — TELEPHONE ENCOUNTER
Caller: Octavio Mercado    Relationship: Self    Best call back number: [phone]    What form or medical record are you requesting: RECENT LAB RESULTS    Who is requesting this form or medical record from you: PATIENT    How would you like to receive the form or medical records (pick-up, mail, fax): PLEASE SEND TO HIS HOME ADDRESS    [facility] [place] [address]

## 2022-05-16 ENCOUNTER — TELEPHONE (OUTPATIENT)
Dept: FAMILY MEDICINE CLINIC | Age: 62
End: 2022-05-16

## 2022-05-16 NOTE — TELEPHONE ENCOUNTER
Caller: Octavio Mercado    Relationship: Self    Best call back number: [phone]    What is the best time to reach you: ANYTIME    Who are you requesting to speak with (clinical staff, provider,  specific staff member):  NURSE     What was the call regarding: PATIENT REQUEST THAT NURSE GIVE HIM A CALL BACK.  HE WAS ASKING ABOUT MEDICAL RECORDS     Do you require a callback: YES

## 2022-06-30 RX ORDER — METFORMIN HYDROCHLORIDE 500 MG/1
1000 TABLET, EXTENDED RELEASE ORAL 2 TIMES DAILY WITH MEALS
Qty: 360 TABLET | Refills: 1 | Status: SHIPPED | OUTPATIENT
Start: 2022-06-30 | End: 2022-08-12 | Stop reason: SDUPTHER

## 2022-06-30 RX ORDER — LISINOPRIL 10 MG/1
10 TABLET ORAL DAILY
Qty: 90 TABLET | Refills: 1 | Status: SHIPPED | OUTPATIENT
Start: 2022-06-30 | End: 2022-08-12 | Stop reason: SDUPTHER

## 2022-08-10 ENCOUNTER — LAB (OUTPATIENT)
Dept: LAB | Facility: HOSPITAL | Age: 62
End: 2022-08-10

## 2022-08-10 DIAGNOSIS — I10 PRIMARY HYPERTENSION: ICD-10-CM

## 2022-08-10 DIAGNOSIS — E11.9 TYPE 2 DIABETES MELLITUS WITHOUT COMPLICATION, WITHOUT LONG-TERM CURRENT USE OF INSULIN: ICD-10-CM

## 2022-08-10 DIAGNOSIS — Z72.9 PROBLEM RELATED TO LIFESTYLE: ICD-10-CM

## 2022-08-10 LAB
ALBUMIN SERPL-MCNC: 4.3 G/DL (ref 3.5–5.2)
ALBUMIN UR-MCNC: <1.2 MG/DL
ALBUMIN/GLOB SERPL: 1.7 G/DL
ALP SERPL-CCNC: 60 U/L (ref 39–117)
ALT SERPL W P-5'-P-CCNC: 9 U/L (ref 1–41)
ANION GAP SERPL CALCULATED.3IONS-SCNC: 9.4 MMOL/L (ref 5–15)
AST SERPL-CCNC: 9 U/L (ref 1–40)
BASOPHILS # BLD AUTO: 0.07 10*3/MM3 (ref 0–0.2)
BASOPHILS NFR BLD AUTO: 1.2 % (ref 0–1.5)
BILIRUB SERPL-MCNC: 1 MG/DL (ref 0–1.2)
BUN SERPL-MCNC: 12 MG/DL (ref 8–23)
BUN/CREAT SERPL: 14 (ref 7–25)
CALCIUM SPEC-SCNC: 9.1 MG/DL (ref 8.6–10.5)
CHLORIDE SERPL-SCNC: 102 MMOL/L (ref 98–107)
CHOLEST SERPL-MCNC: 158 MG/DL (ref 0–200)
CO2 SERPL-SCNC: 25.6 MMOL/L (ref 22–29)
CREAT SERPL-MCNC: 0.86 MG/DL (ref 0.76–1.27)
CREAT UR-MCNC: 107.4 MG/DL
DEPRECATED RDW RBC AUTO: 39.2 FL (ref 37–54)
EGFRCR SERPLBLD CKD-EPI 2021: 98.5 ML/MIN/1.73
EOSINOPHIL # BLD AUTO: 0.19 10*3/MM3 (ref 0–0.4)
EOSINOPHIL NFR BLD AUTO: 3.1 % (ref 0.3–6.2)
ERYTHROCYTE [DISTWIDTH] IN BLOOD BY AUTOMATED COUNT: 13.1 % (ref 12.3–15.4)
GLOBULIN UR ELPH-MCNC: 2.5 GM/DL
GLUCOSE SERPL-MCNC: 161 MG/DL (ref 65–99)
HBA1C MFR BLD: 7.4 % (ref 4.8–5.6)
HCT VFR BLD AUTO: 41.9 % (ref 37.5–51)
HCV AB SER DONR QL: NORMAL
HDLC SERPL-MCNC: 39 MG/DL (ref 40–60)
HGB BLD-MCNC: 14.2 G/DL (ref 13–17.7)
IMM GRANULOCYTES # BLD AUTO: 0.01 10*3/MM3 (ref 0–0.05)
IMM GRANULOCYTES NFR BLD AUTO: 0.2 % (ref 0–0.5)
LDLC SERPL CALC-MCNC: 103 MG/DL (ref 0–100)
LDLC/HDLC SERPL: 2.61 {RATIO}
LYMPHOCYTES # BLD AUTO: 1.55 10*3/MM3 (ref 0.7–3.1)
LYMPHOCYTES NFR BLD AUTO: 25.7 % (ref 19.6–45.3)
MCH RBC QN AUTO: 27.4 PG (ref 26.6–33)
MCHC RBC AUTO-ENTMCNC: 33.9 G/DL (ref 31.5–35.7)
MCV RBC AUTO: 80.7 FL (ref 79–97)
MICROALBUMIN/CREAT UR: NORMAL MG/G{CREAT}
MONOCYTES # BLD AUTO: 0.49 10*3/MM3 (ref 0.1–0.9)
MONOCYTES NFR BLD AUTO: 8.1 % (ref 5–12)
NEUTROPHILS NFR BLD AUTO: 3.73 10*3/MM3 (ref 1.7–7)
NEUTROPHILS NFR BLD AUTO: 61.7 % (ref 42.7–76)
PLATELET # BLD AUTO: 251 10*3/MM3 (ref 140–450)
PMV BLD AUTO: 9.7 FL (ref 6–12)
POTASSIUM SERPL-SCNC: 4.1 MMOL/L (ref 3.5–5.2)
PROT SERPL-MCNC: 6.8 G/DL (ref 6–8.5)
RBC # BLD AUTO: 5.19 10*6/MM3 (ref 4.14–5.8)
SODIUM SERPL-SCNC: 137 MMOL/L (ref 136–145)
TRIGL SERPL-MCNC: 86 MG/DL (ref 0–150)
TSH SERPL DL<=0.05 MIU/L-ACNC: 1.68 UIU/ML (ref 0.27–4.2)
VLDLC SERPL-MCNC: 16 MG/DL (ref 5–40)
WBC NRBC COR # BLD: 6.04 10*3/MM3 (ref 3.4–10.8)

## 2022-08-10 PROCEDURE — 80050 GENERAL HEALTH PANEL: CPT

## 2022-08-10 PROCEDURE — 36415 COLL VENOUS BLD VENIPUNCTURE: CPT

## 2022-08-10 PROCEDURE — 82570 ASSAY OF URINE CREATININE: CPT

## 2022-08-10 PROCEDURE — 86803 HEPATITIS C AB TEST: CPT

## 2022-08-10 PROCEDURE — 83036 HEMOGLOBIN GLYCOSYLATED A1C: CPT

## 2022-08-10 PROCEDURE — 82043 UR ALBUMIN QUANTITATIVE: CPT

## 2022-08-10 PROCEDURE — 80061 LIPID PANEL: CPT

## 2022-08-12 ENCOUNTER — OFFICE VISIT (OUTPATIENT)
Dept: FAMILY MEDICINE CLINIC | Age: 62
End: 2022-08-12

## 2022-08-12 VITALS
HEIGHT: 75 IN | OXYGEN SATURATION: 98 % | BODY MASS INDEX: 25.29 KG/M2 | DIASTOLIC BLOOD PRESSURE: 73 MMHG | HEART RATE: 62 BPM | WEIGHT: 203.4 LBS | SYSTOLIC BLOOD PRESSURE: 115 MMHG

## 2022-08-12 DIAGNOSIS — I10 PRIMARY HYPERTENSION: Primary | ICD-10-CM

## 2022-08-12 DIAGNOSIS — Z12.5 SCREENING FOR PROSTATE CANCER: ICD-10-CM

## 2022-08-12 DIAGNOSIS — Z79.4 TYPE 2 DIABETES MELLITUS WITHOUT COMPLICATION, WITH LONG-TERM CURRENT USE OF INSULIN: ICD-10-CM

## 2022-08-12 DIAGNOSIS — E11.9 TYPE 2 DIABETES MELLITUS WITHOUT COMPLICATION, WITH LONG-TERM CURRENT USE OF INSULIN: ICD-10-CM

## 2022-08-12 PROCEDURE — 99214 OFFICE O/P EST MOD 30 MIN: CPT | Performed by: FAMILY MEDICINE

## 2022-08-12 RX ORDER — METFORMIN HYDROCHLORIDE 500 MG/1
1000 TABLET, EXTENDED RELEASE ORAL 2 TIMES DAILY WITH MEALS
Qty: 360 TABLET | Refills: 1 | Status: SHIPPED | OUTPATIENT
Start: 2022-08-12 | End: 2022-12-27

## 2022-08-12 RX ORDER — LISINOPRIL 10 MG/1
10 TABLET ORAL DAILY
Qty: 90 TABLET | Refills: 1 | Status: SHIPPED | OUTPATIENT
Start: 2022-08-12

## 2022-08-12 NOTE — ASSESSMENT & PLAN NOTE
Reviewed his recent lab work.  Hemoglobin A1c is up a bit but still not real bad.  Have him tighten his diet some.  If remains elevated next visit then consider adding Jardiance.  Reminded him he will be due for eye exam in Sept

## 2022-08-12 NOTE — PROGRESS NOTES
"Chief Complaint  Annual Exam, Diabetes, Hypertension, and covid booster (Pt had the 2nd booster done but didn't have his card with him)    Subjective          Octavio Mercado presents to [facility]  History of Present Illness  Hardeep is in today follow-up on his diabetes and hypertension.  Tolerating his medications okay.  Only checks BS occasionally and it generally BS runs around 160   Not follow his blood pressure   Has noted that he is starting to feel his age.  Cannot work quite as long and hard.  Feeling some aches and pains in the morning.    Does state that his left shoulder has improved some but still not back to par.  He is just learning to do things with his right arm.    Current Outpatient Medications   Medication Sig Dispense Refill   • lisinopril (PRINIVIL,ZESTRIL) 10 MG tablet Take 1 tablet by mouth Daily. 90 tablet 1   • metFORMIN ER (GLUCOPHAGE-XR) 500 MG 24 hr tablet Take 2 tablets by mouth 2 (Two) Times a Day With Meals for 90 days. 360 tablet 1     No current facility-administered medications for this visit.       Review of Systems         Objective   Vital Signs:   /73 (BP Location: Left arm, Patient Position: Sitting, Cuff Size: Adult)   Pulse 62   Ht 190.5 cm (75\")   Wt 92.3 kg (203 lb 6.4 oz)   SpO2 98% Comment: Room air  BMI 25.42 kg/m²     Physical Exam  Constitutional:       Appearance: Normal appearance.   Neck:      Vascular: No carotid bruit.   Cardiovascular:      Rate and Rhythm: Normal rate and regular rhythm.      Heart sounds: Normal heart sounds. No murmur heard.  Pulmonary:      Effort: No respiratory distress.      Breath sounds: No wheezing or rales.   Musculoskeletal:      Right lower leg: No edema.      Left lower leg: No edema.   Lymphadenopathy:      Cervical: No cervical adenopathy.   Neurological:      General: No focal deficit present.      Mental Status: He is alert.   Psychiatric:         Attention and Perception: " Attention normal.         Mood and Affect: Mood normal.         Speech: Speech normal.            Result Review :   The following data was reviewed by: Jaden Carlson MD on 08/12/2022:  Hepatitis C Antibody (08/10/2022 07:16)  CBC Auto Differential (08/10/2022 07:16)  Comprehensive Metabolic Panel (08/10/2022 07:16)  Lipid Panel (08/10/2022 07:16)  Hemoglobin A1c (08/10/2022 07:16)  Microalbumin / Creatinine Urine Ratio - Urine, Clean Catch (08/10/2022 07:16)  TSH (08/10/2022 07:16)                  Assessment and Plan    Diagnoses and all orders for this visit:    1. Primary hypertension (Primary)  Comments:  Blood pressure looks good continue on current regimen.  Reviewed labs which look okay.  Potassium BUN/creatinine doing well  Orders:  -     lisinopril (PRINIVIL,ZESTRIL) 10 MG tablet; Take 1 tablet by mouth Daily.  Dispense: 90 tablet; Refill: 1  -     Lipid Panel; Future  -     Comprehensive Metabolic Panel; Future  -     CBC Auto Differential; Future    2. Type 2 diabetes mellitus without complication, with long-term current use of insulin (HCC)  Assessment & Plan:  Reviewed his recent lab work.  Hemoglobin A1c is up a bit but still not real bad.  Have him tighten his diet some.  If remains elevated next visit then consider adding Jardiance.  Reminded him he will be due for eye exam in Sept    Orders:  -     metFORMIN ER (GLUCOPHAGE-XR) 500 MG 24 hr tablet; Take 2 tablets by mouth 2 (Two) Times a Day With Meals for 90 days.  Dispense: 360 tablet; Refill: 1  -     Hemoglobin A1c; Future  -     Lipid Panel; Future  -     Comprehensive Metabolic Panel; Future    3. Screening for prostate cancer  -     PSA Screen; Future      Follow Up   No follow-ups on file.  Patient was given instructions and counseling regarding his condition or for health maintenance advice. Please see specific information pulled into the AVS if appropriate.

## 2022-10-05 ENCOUNTER — TELEPHONE (OUTPATIENT)
Dept: FAMILY MEDICINE CLINIC | Age: 62
End: 2022-10-05

## 2022-10-05 NOTE — TELEPHONE ENCOUNTER
Caller: Octavio Mercado    Relationship: Self    Best call back number: [phone]    What is the medical concern/diagnosis: BACK PAIN     What is the provider, practice or medical service name:N/A     What is the office location: N/A    What is the office phone number: N/A          PATIENT REQUESTING A REFERRAL  TO SEE A PROVIDER REGARDING BACK PAIN

## 2022-10-07 ENCOUNTER — TELEPHONE (OUTPATIENT)
Dept: FAMILY MEDICINE CLINIC | Age: 62
End: 2022-10-07

## 2022-10-07 NOTE — TELEPHONE ENCOUNTER
Caller: Octavio Mercado    Relationship: Self    Best call back number: [phone]    What is the best time to reach you: ANYTIME    Who are you requesting to speak with (clinical staff, provider,  specific staff member):JACE    Do you know the name of the person who called: PATIENT    What was the call regarding: PATIENT NEEDS TO GET AN XRAY OF HIS BACK BEFORE HE SEES DR GONZALEZ. PLEASE CALL HIM BACK AND SCHEDULE.    Do you require a callback: YES

## 2022-10-07 NOTE — TELEPHONE ENCOUNTER
Caller: Octavio Mercado    Relationship to patient: Self    Best call back number: [phone]    Chief complaint: N/A    Type of visit: N/A    Requested date: N/A     If rescheduling, when is the original appointment: N/A    Additional notes:PLEASE SCHEDULE PATIENT FOR XRAYS OF HIS BACK. HE WOULD LIKE TO HAVE THESE TO DISCUSS WHEN HE SEES DR GONZALEZ.

## 2022-10-10 NOTE — TELEPHONE ENCOUNTER
Caller: Octavio Mercado    Relationship to patient: Self    Best call back number: [phone]    Patient is needing: DEAR JACE,  I AM HOPING THIS WILL FIX THE PROBLEM.    THANK YOU  RG

## 2022-10-10 NOTE — TELEPHONE ENCOUNTER
Caller: Octavio Mercado    Relationship to patient: Self    Best call back number: [phone]    Patient is needing:  JACE     I WAS UNABLE TO SIGN ANYTHING ELSE. THIS INCOMPLETE NOTE NOW SAYS JACE'S NAME ON IT. SPOKE WITH MY SUPERVISOR ABOUT THIS ISSUE. JACE WILL NEED TO SIGN OFF ON THIS NOW TO COMPLETE.    NOTHING ELSE CAN BE DONE HERE. I AM NOT ALLOWED TO COMMUNICATE THROUGH SECURE CHAT PER HUB POLICY. PLEASE CONTACT HUB MANAGEMENT IF FURTHER ASSISTANCE IS NEEDED. THANK YOU.

## 2022-10-10 NOTE — TELEPHONE ENCOUNTER
Caller: Octavio Mercado    Relationship: Self    Best call back number: [phone]    What is the best time to reach you: ANYTIME    Who are you requesting to speak with (clinical staff, provider,  specific staff member):JACE    Do you know the name of the person who called: PATIENT    What was the call regarding: PATIENT NEEDS TO GET AN XRAY OF HIS BACK BEFORE HE SEES DR GONZALEZ. PLEASE CALL HIM BACK AND SCHEDULE.    Do you require a callback: YES

## 2022-10-25 ENCOUNTER — OFFICE VISIT (OUTPATIENT)
Dept: FAMILY MEDICINE CLINIC | Age: 62
End: 2022-10-25

## 2022-10-25 VITALS
HEART RATE: 62 BPM | SYSTOLIC BLOOD PRESSURE: 125 MMHG | WEIGHT: 206 LBS | DIASTOLIC BLOOD PRESSURE: 66 MMHG | HEIGHT: 75 IN | OXYGEN SATURATION: 100 % | BODY MASS INDEX: 25.61 KG/M2

## 2022-10-25 DIAGNOSIS — Z23 NEED FOR INFLUENZA VACCINATION: ICD-10-CM

## 2022-10-25 DIAGNOSIS — M54.16 LUMBAR RADICULOPATHY: Primary | ICD-10-CM

## 2022-10-25 PROCEDURE — 90686 IIV4 VACC NO PRSV 0.5 ML IM: CPT | Performed by: FAMILY MEDICINE

## 2022-10-25 PROCEDURE — 90471 IMMUNIZATION ADMIN: CPT | Performed by: FAMILY MEDICINE

## 2022-10-25 PROCEDURE — 99213 OFFICE O/P EST LOW 20 MIN: CPT | Performed by: FAMILY MEDICINE

## 2022-10-25 NOTE — PROGRESS NOTES
"Chief Complaint  Back Pain (Patient complains of back pain only in the mornings for 1 month ) and DDD (degenerative disc disease), cervical    Subjective          Octavio Mercado presents to [facility]  History of Present Illness    [place] in today reporting some low back pain radiating into the left leg for about the past month.  Says symptoms are worse first thing in the morning and as he gets moving around symptoms improved.  He has not noted any weakness in the left leg.  No trouble controlling bowel or bladder.  Says that he had an MRI in the remote past performed in [place].  Pain is been persistent he decided to get it evaluated.  Not really escalating in intensity.  He has occasionally taken some Tylenol for relief.      Current Outpatient Medications   Medication Sig Dispense Refill   • lisinopril (PRINIVIL,ZESTRIL) 10 MG tablet Take 1 tablet by mouth Daily. 90 tablet 1   • metFORMIN ER (GLUCOPHAGE-XR) 500 MG 24 hr tablet Take 2 tablets by mouth 2 (Two) Times a Day With Meals for 90 days. 360 tablet 1   • diclofenac (VOLTAREN) 50 MG EC tablet Take 1 tablet by mouth 2 (Two) Times a Day. Take with food. 60 tablet 2     No current facility-administered medications for this visit.       Review of Systems     No problems with bowel or bladder control.    Objective   Vital Signs:   /66 (BP Location: Left arm, Patient Position: Sitting, Cuff Size: Adult)   Pulse 62   Ht 190.5 cm (75\")   Wt 93.4 kg (206 lb)   SpO2 100%   BMI 25.75 kg/m²     Physical Exam   No acute distress  Nontender to palpation over the SI joints  Nontender to palpation over the sciatic notch area  Deep tendon reflexes patella and Achilles symmetrical bilaterally  Worse in flexion of the great toe symmetrical bilaterally  Straight leg raise is negative.  Internal and external rotation left hip without symptoms      Result Review :                     Assessment and Plan    Diagnoses and " all orders for this visit:    1. Lumbar radiculopathy (Primary)  Assessment & Plan:  We will start out with anti-inflammatory medications and get him into some physical therapy.  Do not think we need to do imaging at this point as there are no red flag symptoms.  If symptoms fail to resolve, or worsen consider imaging especially if impact on functional status interventions would be considered.    Orders:  -     Ambulatory Referral to Physical Therapy Evaluate and treat  -     diclofenac (VOLTAREN) 50 MG EC tablet; Take 1 tablet by mouth 2 (Two) Times a Day. Take with food.  Dispense: 60 tablet; Refill: 2    2. Need for influenza vaccination  -     FluLaval/Fluzone >6 mos ([id number])      Follow Up   No follow-ups on file.  Patient was given instructions and counseling regarding his condition or for health maintenance advice. Please see specific information pulled into the AVS if appropriate.

## 2022-10-25 NOTE — ASSESSMENT & PLAN NOTE
We will start out with anti-inflammatory medications and get him into some physical therapy.  Do not think we need to do imaging at this point as there are no red flag symptoms.  If symptoms fail to resolve, or worsen consider imaging especially if impact on functional status interventions would be considered.

## 2022-12-12 ENCOUNTER — TELEPHONE (OUTPATIENT)
Dept: FAMILY MEDICINE CLINIC | Age: 62
End: 2022-12-12

## 2022-12-12 NOTE — TELEPHONE ENCOUNTER
Caller: Octavio Mercado    Relationship: Self    Best call back number: [phone]    What is the best time to reach you: ANY     Who are you requesting to speak with (clinical staff, provider,  specific staff member): CLINICAL       What was the call regarding: PATIENT IS WISHING TO SPEAK WITH STILES OR HIS NURSE REGARDING THERAPY FOR HIS BACK AND THAT IT IS NOT HELPING. PLEASE ADVISE.     Do you require a callback: YES

## 2022-12-27 ENCOUNTER — OFFICE VISIT (OUTPATIENT)
Dept: FAMILY MEDICINE CLINIC | Age: 62
End: 2022-12-27

## 2022-12-27 ENCOUNTER — LAB (OUTPATIENT)
Dept: LAB | Facility: HOSPITAL | Age: 62
End: 2022-12-27

## 2022-12-27 VITALS
HEIGHT: 75 IN | BODY MASS INDEX: 25.55 KG/M2 | WEIGHT: 205.5 LBS | DIASTOLIC BLOOD PRESSURE: 71 MMHG | HEART RATE: 69 BPM | SYSTOLIC BLOOD PRESSURE: 125 MMHG | OXYGEN SATURATION: 97 %

## 2022-12-27 DIAGNOSIS — Z79.4 TYPE 2 DIABETES MELLITUS WITHOUT COMPLICATION, WITH LONG-TERM CURRENT USE OF INSULIN: Primary | ICD-10-CM

## 2022-12-27 DIAGNOSIS — E11.9 TYPE 2 DIABETES MELLITUS WITHOUT COMPLICATION, WITH LONG-TERM CURRENT USE OF INSULIN: Primary | ICD-10-CM

## 2022-12-27 DIAGNOSIS — G89.29 CHRONIC LEFT SHOULDER PAIN: ICD-10-CM

## 2022-12-27 DIAGNOSIS — M54.16 LUMBAR RADICULOPATHY: ICD-10-CM

## 2022-12-27 DIAGNOSIS — Z12.5 SCREENING FOR PROSTATE CANCER: ICD-10-CM

## 2022-12-27 DIAGNOSIS — I10 PRIMARY HYPERTENSION: ICD-10-CM

## 2022-12-27 DIAGNOSIS — Z23 NEED FOR VACCINATION: ICD-10-CM

## 2022-12-27 DIAGNOSIS — M25.512 CHRONIC LEFT SHOULDER PAIN: ICD-10-CM

## 2022-12-27 LAB
ALBUMIN SERPL-MCNC: 4.4 G/DL (ref 3.5–5.2)
ALBUMIN/GLOB SERPL: 1.4 G/DL
ALP SERPL-CCNC: 97 U/L (ref 39–117)
ALT SERPL W P-5'-P-CCNC: 9 U/L (ref 1–41)
ANION GAP SERPL CALCULATED.3IONS-SCNC: 8.4 MMOL/L (ref 5–15)
AST SERPL-CCNC: 10 U/L (ref 1–40)
BASOPHILS # BLD AUTO: 0.06 10*3/MM3 (ref 0–0.2)
BASOPHILS NFR BLD AUTO: 0.7 % (ref 0–1.5)
BILIRUB SERPL-MCNC: 0.7 MG/DL (ref 0–1.2)
BUN SERPL-MCNC: 13 MG/DL (ref 8–23)
BUN/CREAT SERPL: 16 (ref 7–25)
CALCIUM SPEC-SCNC: 9.6 MG/DL (ref 8.6–10.5)
CHLORIDE SERPL-SCNC: 101 MMOL/L (ref 98–107)
CHOLEST SERPL-MCNC: 177 MG/DL (ref 0–200)
CO2 SERPL-SCNC: 28.6 MMOL/L (ref 22–29)
CREAT SERPL-MCNC: 0.81 MG/DL (ref 0.76–1.27)
DEPRECATED RDW RBC AUTO: 38 FL (ref 37–54)
EGFRCR SERPLBLD CKD-EPI 2021: 100.3 ML/MIN/1.73
EOSINOPHIL # BLD AUTO: 0.17 10*3/MM3 (ref 0–0.4)
EOSINOPHIL NFR BLD AUTO: 1.9 % (ref 0.3–6.2)
ERYTHROCYTE [DISTWIDTH] IN BLOOD BY AUTOMATED COUNT: 13.2 % (ref 12.3–15.4)
GLOBULIN UR ELPH-MCNC: 3.1 GM/DL
GLUCOSE SERPL-MCNC: 189 MG/DL (ref 65–99)
HBA1C MFR BLD: 8 % (ref 4.8–5.6)
HCT VFR BLD AUTO: 43.7 % (ref 37.5–51)
HDLC SERPL-MCNC: 37 MG/DL (ref 40–60)
HGB BLD-MCNC: 14.6 G/DL (ref 13–17.7)
IMM GRANULOCYTES # BLD AUTO: 0.02 10*3/MM3 (ref 0–0.05)
IMM GRANULOCYTES NFR BLD AUTO: 0.2 % (ref 0–0.5)
LDLC SERPL CALC-MCNC: 109 MG/DL (ref 0–100)
LDLC/HDLC SERPL: 2.84 {RATIO}
LYMPHOCYTES # BLD AUTO: 1.77 10*3/MM3 (ref 0.7–3.1)
LYMPHOCYTES NFR BLD AUTO: 19.6 % (ref 19.6–45.3)
MCH RBC QN AUTO: 26.3 PG (ref 26.6–33)
MCHC RBC AUTO-ENTMCNC: 33.4 G/DL (ref 31.5–35.7)
MCV RBC AUTO: 78.7 FL (ref 79–97)
MONOCYTES # BLD AUTO: 0.76 10*3/MM3 (ref 0.1–0.9)
MONOCYTES NFR BLD AUTO: 8.4 % (ref 5–12)
NEUTROPHILS NFR BLD AUTO: 6.24 10*3/MM3 (ref 1.7–7)
NEUTROPHILS NFR BLD AUTO: 69.2 % (ref 42.7–76)
PLATELET # BLD AUTO: 309 10*3/MM3 (ref 140–450)
PMV BLD AUTO: 8.8 FL (ref 6–12)
POTASSIUM SERPL-SCNC: 4.4 MMOL/L (ref 3.5–5.2)
PROT SERPL-MCNC: 7.5 G/DL (ref 6–8.5)
PSA SERPL-MCNC: 1.3 NG/ML (ref 0–4)
RBC # BLD AUTO: 5.55 10*6/MM3 (ref 4.14–5.8)
SODIUM SERPL-SCNC: 138 MMOL/L (ref 136–145)
TRIGL SERPL-MCNC: 174 MG/DL (ref 0–150)
VLDLC SERPL-MCNC: 31 MG/DL (ref 5–40)
WBC NRBC COR # BLD: 9.02 10*3/MM3 (ref 3.4–10.8)

## 2022-12-27 PROCEDURE — 99214 OFFICE O/P EST MOD 30 MIN: CPT | Performed by: FAMILY MEDICINE

## 2022-12-27 PROCEDURE — 91312 COVID-19 (PFIZER) BIVALENT BOOSTER 12+YRS: CPT | Performed by: FAMILY MEDICINE

## 2022-12-27 PROCEDURE — 80061 LIPID PANEL: CPT | Performed by: FAMILY MEDICINE

## 2022-12-27 PROCEDURE — 83036 HEMOGLOBIN GLYCOSYLATED A1C: CPT | Performed by: FAMILY MEDICINE

## 2022-12-27 PROCEDURE — G0103 PSA SCREENING: HCPCS | Performed by: FAMILY MEDICINE

## 2022-12-27 PROCEDURE — 80053 COMPREHEN METABOLIC PANEL: CPT | Performed by: FAMILY MEDICINE

## 2022-12-27 PROCEDURE — 36415 COLL VENOUS BLD VENIPUNCTURE: CPT | Performed by: FAMILY MEDICINE

## 2022-12-27 PROCEDURE — 0124A COVID-19 (PFIZER) BIVALENT BOOSTER 12+YRS: CPT | Performed by: FAMILY MEDICINE

## 2022-12-27 PROCEDURE — 85025 COMPLETE CBC W/AUTO DIFF WBC: CPT | Performed by: FAMILY MEDICINE

## 2022-12-27 NOTE — ASSESSMENT & PLAN NOTE
He is off work this week he is going to see if the rest will help his symptoms resolve.  If not we will move forward with the MRI of the back to determine if there might be a role for intervention to get him some relief.

## 2022-12-27 NOTE — ASSESSMENT & PLAN NOTE
Trying to rest the shoulder as well as his back.  May end up needing to do further evaluation of the shoulder as well

## 2022-12-27 NOTE — PROGRESS NOTES
"Chief Complaint  Back Pain (Running down left leg.  PT not helping)    Subjective     {Problem List  Visit Diagnosis   Encounters  Notes  Medications  Labs  Result Review Imaging  Media :23}     Octavio Mercado presents to [facility]  History of Present Illness    At his last visit in October Hardeep was having back pain and radiation into the left leg.  Was started on anti-inflammatories and referred to physical therapy. He went to [facility] and thought it was minimally  helpful.  Mostly the pain bothers him while sitting.  If standing doesn't seem to bother him.  He is limiting his lifting at work.  Does state that pain at its worst rates a 9 out of 10.      Still having some discomfort in the left shoulder.  Some restricted range of motion decreased strength.      At his visit in August we had noted increase in his recent hemoglobin A1c.  Asked him to double down on diet with expectation of improvement today.  To fail to improve to consider Jardiance.  He did get his diabetic eye exam September 23.  His BS was 172 this morning and was 149 at last week. Generally checked BS weekly.  Was not have an appointment in February but since he is here today suggest we go ahead and just check his labs and take care for his chronic issues as well          Current Outpatient Medications   Medication Sig Dispense Refill   • diclofenac (VOLTAREN) 50 MG EC tablet Take 1 tablet by mouth 2 (Two) Times a Day. Take with food. 60 tablet 2   • lisinopril (PRINIVIL,ZESTRIL) 10 MG tablet Take 1 tablet by mouth Daily. 90 tablet 1   • metFORMIN ER (GLUCOPHAGE-XR) 500 MG 24 hr tablet Take 2 tablets by mouth 2 (Two) Times a Day With Meals for 90 days. 360 tablet 1     No current facility-administered medications for this visit.       Review of Systems         Objective   Vital Signs:   /71 (BP Location: Left arm, Patient Position: Sitting, Cuff Size: Adult)   Pulse 69   Ht 190.5 cm (75\")   " Wt 93.2 kg (205 lb 8 oz)   SpO2 97%   BMI 25.69 kg/m²     Physical Exam  Constitutional:       Appearance: Normal appearance.   Neck:      Vascular: No carotid bruit.   Cardiovascular:      Rate and Rhythm: Normal rate and regular rhythm.      Heart sounds: Normal heart sounds. No murmur heard.  Pulmonary:      Effort: No respiratory distress.      Breath sounds: No wheezing or rales.   Musculoskeletal:      Right lower leg: No edema.      Left lower leg: No edema.   Lymphadenopathy:      Cervical: No cervical adenopathy.   Neurological:      General: No focal deficit present.      Mental Status: He is alert.      Comments: DTR 2+ at the patella and Achilles bilaterally.  Straight leg raise causes discomfort bilaterally with left side more so than the right.   Psychiatric:         Attention and Perception: Attention normal.         Mood and Affect: Mood normal.         Speech: Speech normal.            Result Review :                     Assessment and Plan    Diagnoses and all orders for this visit:    1. Type 2 diabetes mellitus without complication, with long-term current use of insulin (HCC) (Primary)  Assessment & Plan:  Go ahead and check labs predicate medication change based on those results    Orders:  -     Hemoglobin A1c  -     Lipid Panel    2. Primary hypertension  Assessment & Plan:  Blood pressure looks good continue on current regimen    Orders:  -     Lipid Panel  -     Comprehensive Metabolic Panel  -     CBC Auto Differential    3. Lumbar radiculopathy  Assessment & Plan:  He is off work this week he is going to see if the rest will help his symptoms resolve.  If not we will move forward with the MRI of the back to determine if there might be a role for intervention to get him some relief.    Orders:  -     MRI Lumbar Spine Without Contrast; Future    4. Chronic left shoulder pain  Assessment & Plan:  Trying to rest the shoulder as well as his back.  May end up needing to do further evaluation  of the shoulder as well      5. Need for vaccination  -     COVID-19 Bivalent Booster (Pfizer) 12+yrs    6. Screening for prostate cancer  -     PSA Screen      Follow Up   No follow-ups on file.  Patient was given instructions and counseling regarding his condition or for health maintenance advice. Please see specific information pulled into the AVS if appropriate.

## 2023-01-06 ENCOUNTER — HOSPITAL ENCOUNTER (OUTPATIENT)
Dept: MRI IMAGING | Facility: HOSPITAL | Age: 63
Discharge: HOME | End: 2023-01-06
Admitting: FAMILY MEDICINE
Payer: COMMERCIAL

## 2023-01-06 DIAGNOSIS — M54.16 LUMBAR RADICULOPATHY: ICD-10-CM

## 2023-01-06 PROCEDURE — 72148 MRI LUMBAR SPINE W/O DYE: CPT

## 2023-01-10 ENCOUNTER — TELEPHONE (OUTPATIENT)
Dept: FAMILY MEDICINE CLINIC | Age: 63
End: 2023-01-10

## 2023-01-10 NOTE — TELEPHONE ENCOUNTER
Caller: Octavio Mercado    Relationship: Self    Best call back number: [phone]    Caller requesting test results: PATIENT    What test was performed: MRI    When was the test performed: 1.6.23    Where was the test performed: [facility]    Additional notes: PATIENT WOULD LIKE THE RESULTS FROM HIS MRI ON 1.6.23. PLEASE CALL THE PATIENT BACK WITH THE RESULTS AS SOON AS POSSIBLE.

## 2023-01-11 DIAGNOSIS — M54.16 LUMBAR RADICULOPATHY: Primary | ICD-10-CM

## 2023-01-17 ENCOUNTER — TELEPHONE (OUTPATIENT)
Dept: FAMILY MEDICINE CLINIC | Age: 63
End: 2023-01-17

## 2023-01-17 NOTE — TELEPHONE ENCOUNTER
Caller: Octavio Mercado    Relationship: Self      Who are you requesting to speak with (clinical staff, provider,  specific staff member): MD GONZALEZ NURSE      What was the call regarding: PATIENT WOULD LIKE TO DISCUSS WHY HE HAS NOT HEARD ANYTHING FROM PAIN MANAGEMENT     Do you require a callback: YES

## 2023-01-30 ENCOUNTER — TELEPHONE (OUTPATIENT)
Dept: FAMILY MEDICINE CLINIC | Age: 63
End: 2023-01-30

## 2023-01-30 NOTE — TELEPHONE ENCOUNTER
Caller: Octavio Mercado    Relationship: Self    Best call back number: [phone]    What is the best time to reach you: ANYTIME     Who are you requesting to speak with (clinical staff, provider,  specific staff member): NURSE     What was the call regarding: PATIENT IS CALLING STATING THAT HE WAS PUT ON MELOXICAM AND HE HAS NOTICE HIS BLOOD SUGAR HAS GONE UP ABOUT 30 POINTS, WAS NOT SURE IF IT IS THE MEDICATION OR COULD IT BE SOMETHING ELSE.      Do you require a callback: YES

## 2023-02-13 ENCOUNTER — TELEPHONE (OUTPATIENT)
Dept: FAMILY MEDICINE CLINIC | Age: 63
End: 2023-02-13
Payer: COMMERCIAL

## 2023-02-17 ENCOUNTER — LAB (OUTPATIENT)
Dept: LAB | Facility: HOSPITAL | Age: 63
End: 2023-02-17
Payer: COMMERCIAL

## 2023-02-17 DIAGNOSIS — I10 PRIMARY HYPERTENSION: ICD-10-CM

## 2023-02-17 DIAGNOSIS — Z12.5 SCREENING FOR PROSTATE CANCER: ICD-10-CM

## 2023-02-17 DIAGNOSIS — E11.9 TYPE 2 DIABETES MELLITUS WITHOUT COMPLICATION, WITH LONG-TERM CURRENT USE OF INSULIN: ICD-10-CM

## 2023-02-17 DIAGNOSIS — Z79.4 TYPE 2 DIABETES MELLITUS WITHOUT COMPLICATION, WITH LONG-TERM CURRENT USE OF INSULIN: ICD-10-CM

## 2023-02-17 LAB
ALBUMIN SERPL-MCNC: 4.4 G/DL (ref 3.5–5.2)
ALBUMIN/GLOB SERPL: 1.9 G/DL
ALP SERPL-CCNC: 75 U/L (ref 39–117)
ALT SERPL W P-5'-P-CCNC: 14 U/L (ref 1–41)
ANION GAP SERPL CALCULATED.3IONS-SCNC: 8 MMOL/L (ref 5–15)
AST SERPL-CCNC: 15 U/L (ref 1–40)
BASOPHILS # BLD AUTO: 0.07 10*3/MM3 (ref 0–0.2)
BASOPHILS NFR BLD AUTO: 1.1 % (ref 0–1.5)
BILIRUB SERPL-MCNC: 0.6 MG/DL (ref 0–1.2)
BUN SERPL-MCNC: 18 MG/DL (ref 8–23)
BUN/CREAT SERPL: 21.2 (ref 7–25)
CALCIUM SPEC-SCNC: 8.9 MG/DL (ref 8.6–10.5)
CHLORIDE SERPL-SCNC: 106 MMOL/L (ref 98–107)
CHOLEST SERPL-MCNC: 163 MG/DL (ref 0–200)
CO2 SERPL-SCNC: 26 MMOL/L (ref 22–29)
CREAT SERPL-MCNC: 0.85 MG/DL (ref 0.76–1.27)
DEPRECATED RDW RBC AUTO: 40.8 FL (ref 37–54)
EGFRCR SERPLBLD CKD-EPI 2021: 98.2 ML/MIN/1.73
EOSINOPHIL # BLD AUTO: 0.21 10*3/MM3 (ref 0–0.4)
EOSINOPHIL NFR BLD AUTO: 3.2 % (ref 0.3–6.2)
ERYTHROCYTE [DISTWIDTH] IN BLOOD BY AUTOMATED COUNT: 14.2 % (ref 12.3–15.4)
GLOBULIN UR ELPH-MCNC: 2.3 GM/DL
GLUCOSE SERPL-MCNC: 184 MG/DL (ref 65–99)
HBA1C MFR BLD: 8.2 % (ref 4.8–5.6)
HCT VFR BLD AUTO: 43.5 % (ref 37.5–51)
HDLC SERPL-MCNC: 37 MG/DL (ref 40–60)
HGB BLD-MCNC: 14.6 G/DL (ref 13–17.7)
IMM GRANULOCYTES # BLD AUTO: 0.01 10*3/MM3 (ref 0–0.05)
IMM GRANULOCYTES NFR BLD AUTO: 0.2 % (ref 0–0.5)
LDLC SERPL CALC-MCNC: 113 MG/DL (ref 0–100)
LDLC/HDLC SERPL: 3.03 {RATIO}
LYMPHOCYTES # BLD AUTO: 1.67 10*3/MM3 (ref 0.7–3.1)
LYMPHOCYTES NFR BLD AUTO: 25.7 % (ref 19.6–45.3)
MCH RBC QN AUTO: 26.8 PG (ref 26.6–33)
MCHC RBC AUTO-ENTMCNC: 33.6 G/DL (ref 31.5–35.7)
MCV RBC AUTO: 80 FL (ref 79–97)
MONOCYTES # BLD AUTO: 0.52 10*3/MM3 (ref 0.1–0.9)
MONOCYTES NFR BLD AUTO: 8 % (ref 5–12)
NEUTROPHILS NFR BLD AUTO: 4.01 10*3/MM3 (ref 1.7–7)
NEUTROPHILS NFR BLD AUTO: 61.8 % (ref 42.7–76)
PLATELET # BLD AUTO: 267 10*3/MM3 (ref 140–450)
PMV BLD AUTO: 9.5 FL (ref 6–12)
POTASSIUM SERPL-SCNC: 4.8 MMOL/L (ref 3.5–5.2)
PROT SERPL-MCNC: 6.7 G/DL (ref 6–8.5)
PSA SERPL-MCNC: 1.05 NG/ML (ref 0–4)
RBC # BLD AUTO: 5.44 10*6/MM3 (ref 4.14–5.8)
SODIUM SERPL-SCNC: 140 MMOL/L (ref 136–145)
TRIGL SERPL-MCNC: 69 MG/DL (ref 0–150)
VLDLC SERPL-MCNC: 13 MG/DL (ref 5–40)
WBC NRBC COR # BLD: 6.49 10*3/MM3 (ref 3.4–10.8)

## 2023-02-17 PROCEDURE — G0103 PSA SCREENING: HCPCS

## 2023-02-17 PROCEDURE — 85025 COMPLETE CBC W/AUTO DIFF WBC: CPT

## 2023-02-17 PROCEDURE — 80053 COMPREHEN METABOLIC PANEL: CPT

## 2023-02-17 PROCEDURE — 80061 LIPID PANEL: CPT

## 2023-02-17 PROCEDURE — 36415 COLL VENOUS BLD VENIPUNCTURE: CPT

## 2023-02-17 PROCEDURE — 83036 HEMOGLOBIN GLYCOSYLATED A1C: CPT

## 2023-03-23 DIAGNOSIS — I10 PRIMARY HYPERTENSION: ICD-10-CM

## 2023-03-23 DIAGNOSIS — Z79.4 TYPE 2 DIABETES MELLITUS WITHOUT COMPLICATION, WITH LONG-TERM CURRENT USE OF INSULIN: ICD-10-CM

## 2023-03-23 DIAGNOSIS — E11.9 TYPE 2 DIABETES MELLITUS WITHOUT COMPLICATION, WITH LONG-TERM CURRENT USE OF INSULIN: ICD-10-CM

## 2023-03-23 RX ORDER — LISINOPRIL 10 MG/1
10 TABLET ORAL DAILY
Qty: 90 TABLET | Refills: 1 | OUTPATIENT
Start: 2023-03-23

## 2023-03-23 RX ORDER — METFORMIN HYDROCHLORIDE 500 MG/1
TABLET, EXTENDED RELEASE ORAL
Qty: 360 TABLET | Refills: 1 | OUTPATIENT
Start: 2023-03-23

## 2023-09-05 ENCOUNTER — OFFICE VISIT (OUTPATIENT)
Dept: FAMILY MEDICINE CLINIC | Age: 63
End: 2023-09-05
Payer: COMMERCIAL

## 2023-09-05 VITALS
SYSTOLIC BLOOD PRESSURE: 130 MMHG | HEART RATE: 69 BPM | OXYGEN SATURATION: 98 % | BODY MASS INDEX: 24.99 KG/M2 | HEIGHT: 75 IN | TEMPERATURE: 97.5 F | WEIGHT: 201 LBS | DIASTOLIC BLOOD PRESSURE: 75 MMHG

## 2023-09-05 DIAGNOSIS — U07.1 COVID-19 VIRUS INFECTION: Primary | ICD-10-CM

## 2023-09-05 DIAGNOSIS — R05.1 ACUTE COUGH: ICD-10-CM

## 2023-09-05 DIAGNOSIS — R09.81 NASAL CONGESTION: ICD-10-CM

## 2023-09-05 DIAGNOSIS — J02.9 SORE THROAT: ICD-10-CM

## 2023-09-05 DIAGNOSIS — E11.9 TYPE 2 DIABETES MELLITUS WITHOUT COMPLICATION, WITH LONG-TERM CURRENT USE OF INSULIN: ICD-10-CM

## 2023-09-05 DIAGNOSIS — Z79.4 TYPE 2 DIABETES MELLITUS WITHOUT COMPLICATION, WITH LONG-TERM CURRENT USE OF INSULIN: ICD-10-CM

## 2023-09-05 LAB
EXPIRATION DATE: ABNORMAL
EXPIRATION DATE: NORMAL
FLUAV AG UPPER RESP QL IA.RAPID: NOT DETECTED
FLUBV AG UPPER RESP QL IA.RAPID: NOT DETECTED
INTERNAL CONTROL: ABNORMAL
INTERNAL CONTROL: NORMAL
Lab: ABNORMAL
Lab: NORMAL
S PYO AG THROAT QL: NEGATIVE
SARS-COV-2 AG UPPER RESP QL IA.RAPID: DETECTED

## 2023-09-05 PROCEDURE — 87081 CULTURE SCREEN ONLY: CPT | Performed by: FAMILY MEDICINE

## 2023-09-05 NOTE — ASSESSMENT & PLAN NOTE
Infection is not dramatically affected his blood sugar control.  His sugars did go up some but he said he also had a steroid injection in his shoulder he thought might have affected the blood sugars

## 2023-09-05 NOTE — ASSESSMENT & PLAN NOTE
CDC isolation recommendations reviewed.  Warning signs for worsening condition that would indicate a need to seek immediate evaluation including labored breathing, chest pain, blue around the lips.  Due to his underlying diabetes he is interested in taking treatment.  Paxlovid prescribed.

## 2023-09-05 NOTE — PROGRESS NOTES
"Chief Complaint  Sore Throat, Cough, and Nasal Congestion    Subjective          Octavio Mercado presents to [facility]  History of Present Illness    Patient states on Saturday (3 days ago) he started having a choking feeling in his throat.  Very little cough.  Fever.  Continues to have sore throat today.  No nausea vomiting or diarrhea.  No change in taste or smell.      Current Outpatient Medications   Medication Sig Dispense Refill    diclofenac (VOLTAREN) 50 MG EC tablet Take 1 tablet by mouth 2 (Two) Times a Day. Take with food. 60 tablet 2    lisinopril (PRINIVIL,ZESTRIL) 10 MG tablet TAKE 1 TABLET BY MOUTH DAILY 90 tablet 0    metFORMIN ER (GLUCOPHAGE-XR) 500 MG 24 hr tablet TAKE 2 TABLETS BY MOUTH TWICE DAILY WITH MEALS FOR 90 DAYS 360 tablet 1    Nirmatrelvir&Ritonavir 300/100 (PAXLOVID) 20 x 150 MG & 10 x 100MG tablet therapy pack tablet Take 3 tablets by mouth 2 (Two) Times a Day for 5 days. 30 tablet 0     No current facility-administered medications for this visit.       Review of Systems         Objective   Vital Signs:   /75 (BP Location: Left arm, Patient Position: Sitting, Cuff Size: Adult)   Pulse 69   Temp 97.5 °F (36.4 °C) (Oral)   Ht 190.5 cm (75\")   Wt 91.2 kg (201 lb)   SpO2 98%   BMI 25.12 kg/m²     Physical Exam     No acute distress  No labored breathing  Tympanic membrane's are clear  Oropharynx white erythematous and he has some ulcerative lesions on the uvula and soft palate  No cervical supraclavicular adenopathy  Regular rate and rhythm  Lungs are bilaterally clear      Result Review :                     Assessment and Plan    Diagnoses and all orders for this visit:    1. COVID-19 virus infection (Primary)  Assessment & Plan:  CDC isolation recommendations reviewed.  Warning signs for worsening condition that would indicate a need to seek immediate evaluation including labored breathing, chest pain, blue around the lips.  Due to " his underlying diabetes he is interested in taking treatment.  Paxlovid prescribed.    Orders:  -     Nirmatrelvir&Ritonavir 300/100 (PAXLOVID) 20 x 150 MG & 10 x 100MG tablet therapy pack tablet; Take 3 tablets by mouth 2 (Two) Times a Day for 5 days.  Dispense: 30 tablet; Refill: 0    2. Sore throat  -     POCT SARS-CoV-2 Antigen TYRESE + Flu  -     POCT rapid strep A  -     Beta Strep Culture, Throat - , Throat; Future  -     Beta Strep Culture, Throat - Swab, Throat    3. Acute cough  -     POCT SARS-CoV-2 Antigen TYRESE + Flu    4. Nasal congestion  -     POCT SARS-CoV-2 Antigen TYRESE + Flu    5. Type 2 diabetes mellitus without complication, with long-term current use of insulin  Assessment & Plan:  Infection is not dramatically affected his blood sugar control.  His sugars did go up some but he said he also had a steroid injection in his shoulder he thought might have affected the blood sugars          Follow Up   No follow-ups on file.  Patient was given instructions and counseling regarding his condition or for health maintenance advice. Please see specific information pulled into the AVS if appropriate.

## 2023-09-07 LAB — BACTERIA SPEC AEROBE CULT: NORMAL

## 2023-09-29 DIAGNOSIS — I10 PRIMARY HYPERTENSION: ICD-10-CM

## 2023-09-29 RX ORDER — LISINOPRIL 10 MG/1
10 TABLET ORAL DAILY
Qty: 90 TABLET | Refills: 1 | Status: SHIPPED | OUTPATIENT
Start: 2023-09-29

## 2023-12-16 DIAGNOSIS — E11.9 TYPE 2 DIABETES MELLITUS WITHOUT COMPLICATION, WITH LONG-TERM CURRENT USE OF INSULIN: ICD-10-CM

## 2023-12-16 DIAGNOSIS — Z79.4 TYPE 2 DIABETES MELLITUS WITHOUT COMPLICATION, WITH LONG-TERM CURRENT USE OF INSULIN: ICD-10-CM

## 2023-12-18 RX ORDER — METFORMIN HYDROCHLORIDE 500 MG/1
TABLET, EXTENDED RELEASE ORAL
Qty: 360 TABLET | Refills: 0 | Status: SHIPPED | OUTPATIENT
Start: 2023-12-18

## 2023-12-27 DIAGNOSIS — M54.16 LUMBAR RADICULOPATHY: ICD-10-CM

## 2024-01-16 ENCOUNTER — LAB (OUTPATIENT)
Dept: LAB | Facility: HOSPITAL | Age: 64
End: 2024-01-16
Payer: COMMERCIAL

## 2024-01-16 DIAGNOSIS — I10 PRIMARY HYPERTENSION: ICD-10-CM

## 2024-01-16 DIAGNOSIS — Z79.4 TYPE 2 DIABETES MELLITUS WITHOUT COMPLICATION, WITH LONG-TERM CURRENT USE OF INSULIN: ICD-10-CM

## 2024-01-16 DIAGNOSIS — E11.9 TYPE 2 DIABETES MELLITUS WITHOUT COMPLICATION, WITH LONG-TERM CURRENT USE OF INSULIN: ICD-10-CM

## 2024-01-16 LAB
ALBUMIN SERPL-MCNC: 4.7 G/DL (ref 3.5–5.2)
ALBUMIN UR-MCNC: <1.2 MG/DL
ALBUMIN/GLOB SERPL: 2.2 G/DL
ALP SERPL-CCNC: 62 U/L (ref 39–117)
ALT SERPL W P-5'-P-CCNC: 22 U/L (ref 1–41)
ANION GAP SERPL CALCULATED.3IONS-SCNC: 9 MMOL/L (ref 5–15)
AST SERPL-CCNC: 16 U/L (ref 1–40)
BASOPHILS # BLD AUTO: 0.06 10*3/MM3 (ref 0–0.2)
BASOPHILS NFR BLD AUTO: 1.1 % (ref 0–1.5)
BILIRUB SERPL-MCNC: 0.6 MG/DL (ref 0–1.2)
BUN SERPL-MCNC: 12 MG/DL (ref 8–23)
BUN/CREAT SERPL: 13.2 (ref 7–25)
CALCIUM SPEC-SCNC: 9.4 MG/DL (ref 8.6–10.5)
CHLORIDE SERPL-SCNC: 104 MMOL/L (ref 98–107)
CHOLEST SERPL-MCNC: 168 MG/DL (ref 0–200)
CO2 SERPL-SCNC: 27 MMOL/L (ref 22–29)
CREAT SERPL-MCNC: 0.91 MG/DL (ref 0.76–1.27)
CREAT UR-MCNC: 30 MG/DL
DEPRECATED RDW RBC AUTO: 38.1 FL (ref 37–54)
EGFRCR SERPLBLD CKD-EPI 2021: 95.3 ML/MIN/1.73
EOSINOPHIL # BLD AUTO: 0.18 10*3/MM3 (ref 0–0.4)
EOSINOPHIL NFR BLD AUTO: 3.4 % (ref 0.3–6.2)
ERYTHROCYTE [DISTWIDTH] IN BLOOD BY AUTOMATED COUNT: 12.5 % (ref 12.3–15.4)
GLOBULIN UR ELPH-MCNC: 2.1 GM/DL
GLUCOSE SERPL-MCNC: 180 MG/DL (ref 65–99)
HBA1C MFR BLD: 7.3 % (ref 4.8–5.6)
HCT VFR BLD AUTO: 46.3 % (ref 37.5–51)
HDLC SERPL-MCNC: 41 MG/DL (ref 40–60)
HGB BLD-MCNC: 15.6 G/DL (ref 13–17.7)
IMM GRANULOCYTES # BLD AUTO: 0.01 10*3/MM3 (ref 0–0.05)
IMM GRANULOCYTES NFR BLD AUTO: 0.2 % (ref 0–0.5)
LDLC SERPL CALC-MCNC: 113 MG/DL (ref 0–100)
LDLC/HDLC SERPL: 2.73 {RATIO}
LYMPHOCYTES # BLD AUTO: 1.38 10*3/MM3 (ref 0.7–3.1)
LYMPHOCYTES NFR BLD AUTO: 25.9 % (ref 19.6–45.3)
MCH RBC QN AUTO: 27.9 PG (ref 26.6–33)
MCHC RBC AUTO-ENTMCNC: 33.7 G/DL (ref 31.5–35.7)
MCV RBC AUTO: 82.8 FL (ref 79–97)
MICROALBUMIN/CREAT UR: NORMAL MG/G{CREAT}
MONOCYTES # BLD AUTO: 0.51 10*3/MM3 (ref 0.1–0.9)
MONOCYTES NFR BLD AUTO: 9.6 % (ref 5–12)
NEUTROPHILS NFR BLD AUTO: 3.18 10*3/MM3 (ref 1.7–7)
NEUTROPHILS NFR BLD AUTO: 59.8 % (ref 42.7–76)
PLATELET # BLD AUTO: 272 10*3/MM3 (ref 140–450)
PMV BLD AUTO: 9.5 FL (ref 6–12)
POTASSIUM SERPL-SCNC: 4.3 MMOL/L (ref 3.5–5.2)
PROT SERPL-MCNC: 6.8 G/DL (ref 6–8.5)
RBC # BLD AUTO: 5.59 10*6/MM3 (ref 4.14–5.8)
SODIUM SERPL-SCNC: 140 MMOL/L (ref 136–145)
TRIGL SERPL-MCNC: 75 MG/DL (ref 0–150)
VLDLC SERPL-MCNC: 14 MG/DL (ref 5–40)
WBC NRBC COR # BLD AUTO: 5.32 10*3/MM3 (ref 3.4–10.8)

## 2024-01-16 PROCEDURE — 80061 LIPID PANEL: CPT

## 2024-01-16 PROCEDURE — 85025 COMPLETE CBC W/AUTO DIFF WBC: CPT

## 2024-01-16 PROCEDURE — 82570 ASSAY OF URINE CREATININE: CPT

## 2024-01-16 PROCEDURE — 82043 UR ALBUMIN QUANTITATIVE: CPT

## 2024-01-16 PROCEDURE — 80053 COMPREHEN METABOLIC PANEL: CPT

## 2024-01-16 PROCEDURE — 83036 HEMOGLOBIN GLYCOSYLATED A1C: CPT

## 2024-01-16 PROCEDURE — 36415 COLL VENOUS BLD VENIPUNCTURE: CPT

## 2024-01-29 ENCOUNTER — OFFICE VISIT (OUTPATIENT)
Dept: FAMILY MEDICINE CLINIC | Age: 64
End: 2024-01-29
Payer: COMMERCIAL

## 2024-01-29 ENCOUNTER — TELEPHONE (OUTPATIENT)
Dept: FAMILY MEDICINE CLINIC | Age: 64
End: 2024-01-29

## 2024-01-29 VITALS
BODY MASS INDEX: 25.29 KG/M2 | TEMPERATURE: 98 F | HEART RATE: 69 BPM | SYSTOLIC BLOOD PRESSURE: 137 MMHG | WEIGHT: 203.4 LBS | HEIGHT: 75 IN | DIASTOLIC BLOOD PRESSURE: 74 MMHG

## 2024-01-29 DIAGNOSIS — I49.9 CARDIAC ARRHYTHMIA, UNSPECIFIED CARDIAC ARRHYTHMIA TYPE: ICD-10-CM

## 2024-01-29 DIAGNOSIS — Z79.4 TYPE 2 DIABETES MELLITUS WITHOUT COMPLICATION, WITH LONG-TERM CURRENT USE OF INSULIN: Primary | ICD-10-CM

## 2024-01-29 DIAGNOSIS — Z23 NEED FOR INFLUENZA VACCINATION: ICD-10-CM

## 2024-01-29 DIAGNOSIS — I10 PRIMARY HYPERTENSION: ICD-10-CM

## 2024-01-29 DIAGNOSIS — E78.2 MIXED HYPERLIPIDEMIA: ICD-10-CM

## 2024-01-29 DIAGNOSIS — E11.9 TYPE 2 DIABETES MELLITUS WITHOUT COMPLICATION, WITH LONG-TERM CURRENT USE OF INSULIN: Primary | ICD-10-CM

## 2024-01-29 PROCEDURE — 99214 OFFICE O/P EST MOD 30 MIN: CPT | Performed by: FAMILY MEDICINE

## 2024-01-29 PROCEDURE — 93000 ELECTROCARDIOGRAM COMPLETE: CPT | Performed by: FAMILY MEDICINE

## 2024-01-29 PROCEDURE — 90471 IMMUNIZATION ADMIN: CPT | Performed by: FAMILY MEDICINE

## 2024-01-29 PROCEDURE — 90686 IIV4 VACC NO PRSV 0.5 ML IM: CPT | Performed by: FAMILY MEDICINE

## 2024-01-29 RX ORDER — ATORVASTATIN CALCIUM 10 MG/1
10 TABLET, FILM COATED ORAL NIGHTLY
Qty: 90 TABLET | Refills: 1 | Status: SHIPPED | OUTPATIENT
Start: 2024-01-29

## 2024-01-29 NOTE — TELEPHONE ENCOUNTER
He states he may have gotten a tetanus shot at [facility] recently.  Please call and confirm and update EMR

## 2024-01-29 NOTE — PROGRESS NOTES
"Chief Complaint  Hypertension and Diabetes    Subjective          Octavio Mercado presents to [facility]  History of Present Illness    Has Diabetes reports his blood sugars are doing good.  Checks his sugars about once a week.  Tolerating his medicine well.    Hypertension    Noted today that he did have some ectopy.  He is not aware of any palpitations from self.    Lumbar radiculopathy had been followed by pain management at [facility], Dr. Violet Sotkes.    In regards to his right shoulder adhesive capsulitis I see he was released from follow-up from orthopedics at his last visit in October.  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    Current Outpatient Medications on File Prior to Visit   Medication Sig Dispense Refill    diclofenac (VOLTAREN) 50 MG EC tablet TAKE 1 TABLET BY MOUTH TWICE DAILY. TAKE WITH FOOD! 60 tablet 2    lisinopril (PRINIVIL,ZESTRIL) 10 MG tablet TAKE 1 TABLET BY MOUTH DAILY 90 tablet 1    metFORMIN ER (GLUCOPHAGE-XR) 500 MG 24 hr tablet TAKE 2 TABLETS BY MOUTH TWICE DAILY WITH MEALS FOR 90 DAYS 360 tablet 0     No current facility-administered medications on file prior to visit.       Review of Systems         Objective   Vital Signs:   /74 (BP Location: Left arm, Patient Position: Sitting)   Pulse 69   Temp 98 °F (36.7 °C) (Oral)   Ht 190.5 cm (75\")   Wt 92.3 kg (203 lb 6.4 oz)   BMI 25.42 kg/m²     Physical Exam     No acute distress  Color is good  Eyes are nonicteric  Heart is regular rhythm with occasional ectopy  Lungs are clear  Abdomen soft nontender  Lower extremities without edema  Feet without concerning calluses or ulcerations bilaterally  Dorsalis pedis posterior tibial pulses are good bilaterally  Monofilament testing is good 5/5 bilaterally    Result Review :   The following data was reviewed by: Jaden Carlson MD on 01/29/2024:  Microalbumin / Creatinine Urine Ratio - Urine, Clean Catch (01/16/2024 06:57)  CBC Auto " Differential (01/16/2024 06:57)  Comprehensive Metabolic Panel (01/16/2024 06:57)  Lipid Panel (01/16/2024 06:57)  Hemoglobin A1c (01/16/2024 06:57)           ECG 12 Lead    Date/Time: 1/29/2024 10:49 AM  Performed by: Jaden Carlson MD    Authorized by: Jaden Carlson MD  Comparison: not compared with previous ECG   Previous ECG: no previous ECG available  Rhythm: sinus rhythm  Rate: normal  Conduction: conduction normal  ST Segments: ST segments normal  T Waves: T waves normal  QRS axis: normal    Clinical impression: normal ECG            Assessment and Plan    Diagnoses and all orders for this visit:    1. Type 2 diabetes mellitus without complication, with long-term current use of insulin (Primary)  Assessment & Plan:  Hemoglobin A1c is not perfect but it is pretty darn good.  Continue on current regimen    Orders:  -     Hemoglobin A1c; Future    2. Primary hypertension  Assessment & Plan:  Blood pressure is okay continue on current regimen    Orders:  -     Comprehensive Metabolic Panel; Future  -     CBC Auto Differential; Future    3. Need for influenza vaccination  -     Fluzone (or Fluarix & Flulaval for VFC) >6mos    4. Mixed hyperlipidemia  Assessment & Plan:  Cholesterol little higher than I would like to see.  Will add atorvastatin    Orders:  -     atorvastatin (LIPITOR) 10 MG tablet; Take 1 tablet by mouth Every Night.  Dispense: 90 tablet; Refill: 1  -     Lipid Panel; Future  -     Comprehensive Metabolic Panel; Future    5. Cardiac arrhythmia, unspecified cardiac arrhythmia type  Assessment & Plan:  He had some ectopy on auscultation that is a new finding for his exam.  He did not demonstrate any ectopy on EKG.    Orders:  -     ECG 12 Lead        Follow Up   No follow-ups on file.  Patient was given instructions and counseling regarding his condition or for health maintenance advice. Please see specific information pulled into the AVS if appropriate.

## 2024-01-29 NOTE — ASSESSMENT & PLAN NOTE
He had some ectopy on auscultation that is a new finding for his exam.  He did not demonstrate any ectopy on EKG.

## 2024-03-09 DIAGNOSIS — I10 PRIMARY HYPERTENSION: ICD-10-CM

## 2024-03-11 RX ORDER — LISINOPRIL 10 MG/1
10 TABLET ORAL DAILY
Qty: 90 TABLET | Refills: 1 | Status: SHIPPED | OUTPATIENT
Start: 2024-03-11

## 2024-04-04 ENCOUNTER — TELEPHONE (OUTPATIENT)
Dept: FAMILY MEDICINE CLINIC | Age: 64
End: 2024-04-04
Payer: COMMERCIAL

## 2024-04-04 NOTE — TELEPHONE ENCOUNTER
"Caller: Octavio Mercado \"Hardeep\"    Relationship: Self    Best call back number: [phone]    What form or medical record are you requesting: COPY OF LAST LABS RESULTS     Who is requesting this form or medical record from you: PERSONAL RECORDS    How would you like to receive the form or medical records (pick-up, mail, fax):   If fax, what is the fax number:   If mail, what is the address: ADDRESS ON FILE  If pick-up, provide patient with address and location details    "

## 2024-04-08 DIAGNOSIS — Z79.4 TYPE 2 DIABETES MELLITUS WITHOUT COMPLICATION, WITH LONG-TERM CURRENT USE OF INSULIN: ICD-10-CM

## 2024-04-08 DIAGNOSIS — E11.9 TYPE 2 DIABETES MELLITUS WITHOUT COMPLICATION, WITH LONG-TERM CURRENT USE OF INSULIN: ICD-10-CM

## 2024-04-09 RX ORDER — METFORMIN HYDROCHLORIDE 500 MG/1
TABLET, EXTENDED RELEASE ORAL
Qty: 360 TABLET | Refills: 1 | Status: SHIPPED | OUTPATIENT
Start: 2024-04-09

## 2024-06-28 DIAGNOSIS — M54.16 LUMBAR RADICULOPATHY: ICD-10-CM

## 2024-07-21 DIAGNOSIS — E78.2 MIXED HYPERLIPIDEMIA: ICD-10-CM

## 2024-07-23 RX ORDER — ATORVASTATIN CALCIUM 10 MG/1
10 TABLET, FILM COATED ORAL NIGHTLY
Qty: 90 TABLET | Refills: 1 | Status: SHIPPED | OUTPATIENT
Start: 2024-07-23

## 2024-07-31 ENCOUNTER — LAB (OUTPATIENT)
Dept: LAB | Facility: HOSPITAL | Age: 64
End: 2024-07-31
Payer: COMMERCIAL

## 2024-07-31 DIAGNOSIS — E11.9 TYPE 2 DIABETES MELLITUS WITHOUT COMPLICATION, WITH LONG-TERM CURRENT USE OF INSULIN: ICD-10-CM

## 2024-07-31 DIAGNOSIS — I10 PRIMARY HYPERTENSION: ICD-10-CM

## 2024-07-31 DIAGNOSIS — Z79.4 TYPE 2 DIABETES MELLITUS WITHOUT COMPLICATION, WITH LONG-TERM CURRENT USE OF INSULIN: ICD-10-CM

## 2024-07-31 DIAGNOSIS — E78.2 MIXED HYPERLIPIDEMIA: ICD-10-CM

## 2024-07-31 LAB
ALBUMIN SERPL-MCNC: 4.6 G/DL (ref 3.5–5.2)
ALBUMIN/GLOB SERPL: 1.9 G/DL
ALP SERPL-CCNC: 60 U/L (ref 39–117)
ALT SERPL W P-5'-P-CCNC: 15 U/L (ref 1–41)
ANION GAP SERPL CALCULATED.3IONS-SCNC: 6.8 MMOL/L (ref 5–15)
AST SERPL-CCNC: 16 U/L (ref 1–40)
BASOPHILS # BLD AUTO: 0.04 10*3/MM3 (ref 0–0.2)
BASOPHILS NFR BLD AUTO: 0.7 % (ref 0–1.5)
BILIRUB SERPL-MCNC: 1.1 MG/DL (ref 0–1.2)
BUN SERPL-MCNC: 12 MG/DL (ref 8–23)
BUN/CREAT SERPL: 14 (ref 7–25)
CALCIUM SPEC-SCNC: 9.3 MG/DL (ref 8.6–10.5)
CHLORIDE SERPL-SCNC: 104 MMOL/L (ref 98–107)
CHOLEST SERPL-MCNC: 127 MG/DL (ref 0–200)
CO2 SERPL-SCNC: 27.2 MMOL/L (ref 22–29)
CREAT SERPL-MCNC: 0.86 MG/DL (ref 0.76–1.27)
DEPRECATED RDW RBC AUTO: 39.7 FL (ref 37–54)
EGFRCR SERPLBLD CKD-EPI 2021: 97.3 ML/MIN/1.73
EOSINOPHIL # BLD AUTO: 0.23 10*3/MM3 (ref 0–0.4)
EOSINOPHIL NFR BLD AUTO: 3.8 % (ref 0.3–6.2)
ERYTHROCYTE [DISTWIDTH] IN BLOOD BY AUTOMATED COUNT: 13 % (ref 12.3–15.4)
GLOBULIN UR ELPH-MCNC: 2.4 GM/DL
GLUCOSE SERPL-MCNC: 175 MG/DL (ref 65–99)
HBA1C MFR BLD: 7.6 % (ref 4.8–5.6)
HCT VFR BLD AUTO: 44.1 % (ref 37.5–51)
HDLC SERPL-MCNC: 38 MG/DL (ref 40–60)
HGB BLD-MCNC: 14.9 G/DL (ref 13–17.7)
IMM GRANULOCYTES # BLD AUTO: 0.01 10*3/MM3 (ref 0–0.05)
IMM GRANULOCYTES NFR BLD AUTO: 0.2 % (ref 0–0.5)
LDLC SERPL CALC-MCNC: 74 MG/DL (ref 0–100)
LDLC/HDLC SERPL: 1.95 {RATIO}
LYMPHOCYTES # BLD AUTO: 1.7 10*3/MM3 (ref 0.7–3.1)
LYMPHOCYTES NFR BLD AUTO: 27.9 % (ref 19.6–45.3)
MCH RBC QN AUTO: 28.3 PG (ref 26.6–33)
MCHC RBC AUTO-ENTMCNC: 33.8 G/DL (ref 31.5–35.7)
MCV RBC AUTO: 83.8 FL (ref 79–97)
MONOCYTES # BLD AUTO: 0.48 10*3/MM3 (ref 0.1–0.9)
MONOCYTES NFR BLD AUTO: 7.9 % (ref 5–12)
NEUTROPHILS NFR BLD AUTO: 3.63 10*3/MM3 (ref 1.7–7)
NEUTROPHILS NFR BLD AUTO: 59.5 % (ref 42.7–76)
PLATELET # BLD AUTO: 269 10*3/MM3 (ref 140–450)
PMV BLD AUTO: 9.9 FL (ref 6–12)
POTASSIUM SERPL-SCNC: 4 MMOL/L (ref 3.5–5.2)
PROT SERPL-MCNC: 7 G/DL (ref 6–8.5)
RBC # BLD AUTO: 5.26 10*6/MM3 (ref 4.14–5.8)
SODIUM SERPL-SCNC: 138 MMOL/L (ref 136–145)
TRIGL SERPL-MCNC: 74 MG/DL (ref 0–150)
VLDLC SERPL-MCNC: 15 MG/DL (ref 5–40)
WBC NRBC COR # BLD AUTO: 6.09 10*3/MM3 (ref 3.4–10.8)

## 2024-07-31 PROCEDURE — 80053 COMPREHEN METABOLIC PANEL: CPT

## 2024-07-31 PROCEDURE — 83036 HEMOGLOBIN GLYCOSYLATED A1C: CPT

## 2024-07-31 PROCEDURE — 85025 COMPLETE CBC W/AUTO DIFF WBC: CPT

## 2024-07-31 PROCEDURE — 36415 COLL VENOUS BLD VENIPUNCTURE: CPT

## 2024-07-31 PROCEDURE — 80061 LIPID PANEL: CPT

## 2024-08-02 ENCOUNTER — OFFICE VISIT (OUTPATIENT)
Dept: FAMILY MEDICINE CLINIC | Age: 64
End: 2024-08-02
Payer: COMMERCIAL

## 2024-08-02 VITALS
TEMPERATURE: 97.9 F | SYSTOLIC BLOOD PRESSURE: 123 MMHG | BODY MASS INDEX: 24.85 KG/M2 | HEART RATE: 63 BPM | DIASTOLIC BLOOD PRESSURE: 60 MMHG | HEIGHT: 75 IN | WEIGHT: 199.9 LBS

## 2024-08-02 DIAGNOSIS — Z79.4 TYPE 2 DIABETES MELLITUS WITHOUT COMPLICATION, WITH LONG-TERM CURRENT USE OF INSULIN: Primary | ICD-10-CM

## 2024-08-02 DIAGNOSIS — E78.2 MIXED HYPERLIPIDEMIA: ICD-10-CM

## 2024-08-02 DIAGNOSIS — E11.9 TYPE 2 DIABETES MELLITUS WITHOUT COMPLICATION, WITH LONG-TERM CURRENT USE OF INSULIN: Primary | ICD-10-CM

## 2024-08-02 DIAGNOSIS — I10 PRIMARY HYPERTENSION: ICD-10-CM

## 2024-08-02 PROCEDURE — 99214 OFFICE O/P EST MOD 30 MIN: CPT | Performed by: FAMILY MEDICINE

## 2024-08-02 NOTE — PROGRESS NOTES
"Chief Complaint  Diabetes, Hyperlipidemia, and Hypertension    Subjective          Octavio Mercado presents to [facility]  History of Present Illness      Blood pressures today look great.  Tolerating his medicine well.    Blood sugars he says he does not check real often but generally doing okay.  His weight is up just a little bit from his last visit, over 5 years she is down several pounds.    He remains quite active.  In fact says his job is demanding enough he is finding it to be a little more difficult as he is aging.    Current Outpatient Medications on File Prior to Visit   Medication Sig Dispense Refill    atorvastatin (LIPITOR) 10 MG tablet TAKE 1 TABLET BY MOUTH EVERY night 90 tablet 1    diclofenac (VOLTAREN) 50 MG EC tablet TAKE 1 TABLET BY MOUTH TWICE DAILY. TAKE WITH FOOD! 60 tablet 2    lisinopril (PRINIVIL,ZESTRIL) 10 MG tablet TAKE 1 TABLET BY MOUTH DAILY 90 tablet 1    metFORMIN ER (GLUCOPHAGE-XR) 500 MG 24 hr tablet TAKE 2 TABLETS BY MOUTH TWICE DAILY WITH MEALS FOR 90 DAYS 360 tablet 1     No current facility-administered medications on file prior to visit.       Review of Systems         Objective   Vital Signs:   /60 (BP Location: Left arm, Patient Position: Sitting)   Pulse 63   Temp 97.9 °F (36.6 °C) (Temporal)   Ht 190.5 cm (75\")   Wt 90.7 kg (199 lb 14.4 oz)   BMI 24.99 kg/m²     Physical Exam  Constitutional:       Appearance: Normal appearance.   Neck:      Vascular: No carotid bruit.   Cardiovascular:      Rate and Rhythm: Normal rate and regular rhythm.      Heart sounds: Normal heart sounds. No murmur heard.  Pulmonary:      Effort: No respiratory distress.      Breath sounds: No wheezing or rales.   Musculoskeletal:      Right lower leg: No edema.      Left lower leg: No edema.      Right foot: No deformity.      Left foot: No deformity.   Feet:      Right foot:      Protective Sensation: 5 sites tested.  5 sites sensed.      Skin " integrity: No ulcer or callus.      Toenail Condition: Right toenails are normal.      Left foot:      Protective Sensation: 5 sites tested.  5 sites sensed.      Skin integrity: No ulcer or callus.      Toenail Condition: Left toenails are normal.      Comments: Dorsalis pedis posterior tibial pulses are good bilateral and feet are without concerning calluses, ulcerations, or other deformities. Sensation intact to monofilament testing.        Lymphadenopathy:      Cervical: No cervical adenopathy.   Neurological:      General: No focal deficit present.      Mental Status: He is alert.   Psychiatric:         Attention and Perception: Attention normal.         Mood and Affect: Mood normal.         Speech: Speech normal.            Result Review :   The following data was reviewed by: Jaden Carlson MD on 08/02/2024:  CBC Auto Differential (07/31/2024 07:01)  Comprehensive Metabolic Panel (07/31/2024 07:01)  Lipid Panel (07/31/2024 07:01)  Hemoglobin A1c (07/31/2024 07:01)                Assessment and Plan    Diagnoses and all orders for this visit:    1. Type 2 diabetes mellitus without complication, with long-term current use of insulin (Primary)  Assessment & Plan:  Hemoglobin A1c is up a bit higher than would like to see.  Order to add Jardiance 10 mg daily.  Precautions for possible side effects.      Orders:  -     empagliflozin (Jardiance) 10 MG tablet tablet; Take 1 tablet by mouth Daily.  Dispense: 90 tablet; Refill: 1    2. Primary hypertension  Assessment & Plan:  Blood pressure looks great continue current regimen       3. Mixed hyperlipidemia  Assessment & Plan:  Lipids likewise look quite good.  Continue on current regimen           Follow Up   No follow-ups on file.  Patient was given instructions and counseling regarding his condition or for health maintenance advice. Please see specific information pulled into the AVS if appropriate.

## 2024-08-02 NOTE — ASSESSMENT & PLAN NOTE
Hemoglobin A1c is up a bit higher than would like to see.  Order to add Jardiance 10 mg daily.  Precautions for possible side effects.

## 2024-08-15 DIAGNOSIS — I10 PRIMARY HYPERTENSION: ICD-10-CM

## 2024-08-15 RX ORDER — LISINOPRIL 10 MG/1
10 TABLET ORAL DAILY
Qty: 90 TABLET | Refills: 1 | Status: SHIPPED | OUTPATIENT
Start: 2024-08-15

## 2024-09-27 DIAGNOSIS — E11.9 TYPE 2 DIABETES MELLITUS WITHOUT COMPLICATION, WITH LONG-TERM CURRENT USE OF INSULIN: ICD-10-CM

## 2024-09-27 DIAGNOSIS — Z79.4 TYPE 2 DIABETES MELLITUS WITHOUT COMPLICATION, WITH LONG-TERM CURRENT USE OF INSULIN: ICD-10-CM

## 2024-09-27 RX ORDER — METFORMIN HCL 500 MG
TABLET, EXTENDED RELEASE 24 HR ORAL
Qty: 360 TABLET | Refills: 1 | Status: SHIPPED | OUTPATIENT
Start: 2024-09-27

## 2024-12-30 DIAGNOSIS — M54.16 LUMBAR RADICULOPATHY: ICD-10-CM

## 2025-01-25 DIAGNOSIS — E78.2 MIXED HYPERLIPIDEMIA: ICD-10-CM

## 2025-01-27 RX ORDER — ATORVASTATIN CALCIUM 10 MG/1
10 TABLET, FILM COATED ORAL NIGHTLY
Qty: 90 TABLET | Refills: 1 | Status: SHIPPED | OUTPATIENT
Start: 2025-01-27

## 2025-02-04 ENCOUNTER — TELEPHONE (OUTPATIENT)
Dept: FAMILY MEDICINE CLINIC | Age: 65
End: 2025-02-04
Payer: COMMERCIAL

## 2025-02-04 DIAGNOSIS — Z79.4 TYPE 2 DIABETES MELLITUS WITHOUT COMPLICATION, WITH LONG-TERM CURRENT USE OF INSULIN: Primary | ICD-10-CM

## 2025-02-04 DIAGNOSIS — Z12.5 SCREENING FOR PROSTATE CANCER: ICD-10-CM

## 2025-02-04 DIAGNOSIS — E78.2 MIXED HYPERLIPIDEMIA: ICD-10-CM

## 2025-02-04 DIAGNOSIS — I10 PRIMARY HYPERTENSION: ICD-10-CM

## 2025-02-04 DIAGNOSIS — E11.9 TYPE 2 DIABETES MELLITUS WITHOUT COMPLICATION, WITH LONG-TERM CURRENT USE OF INSULIN: Primary | ICD-10-CM

## 2025-02-04 NOTE — TELEPHONE ENCOUNTER
"     Caller: Octavio Mercado \"Hardeep\"    Relationship: Self    Best call back number:   Telephone Information:   Mobile [phone]         What orders are you requesting (i.e. lab or imaging): LABS     In what timeframe would the patient need to come in: BEFORE APPOINTMENT     Where will you receive your lab/imaging services:      Additional notes:        PATIENT SAID TO  CALL OR TEXT WHEN ORDERS ARE READY      "

## 2025-02-05 ENCOUNTER — LAB (OUTPATIENT)
Dept: LAB | Facility: HOSPITAL | Age: 65
End: 2025-02-05
Payer: COMMERCIAL

## 2025-02-05 DIAGNOSIS — E11.9 TYPE 2 DIABETES MELLITUS WITHOUT COMPLICATION, WITH LONG-TERM CURRENT USE OF INSULIN: ICD-10-CM

## 2025-02-05 DIAGNOSIS — Z12.5 SCREENING FOR PROSTATE CANCER: ICD-10-CM

## 2025-02-05 DIAGNOSIS — E78.2 MIXED HYPERLIPIDEMIA: ICD-10-CM

## 2025-02-05 DIAGNOSIS — I10 PRIMARY HYPERTENSION: ICD-10-CM

## 2025-02-05 DIAGNOSIS — Z79.4 TYPE 2 DIABETES MELLITUS WITHOUT COMPLICATION, WITH LONG-TERM CURRENT USE OF INSULIN: ICD-10-CM

## 2025-02-05 LAB
ALBUMIN SERPL-MCNC: 4.2 G/DL (ref 3.5–5.2)
ALBUMIN UR-MCNC: <1.2 MG/DL
ALBUMIN/GLOB SERPL: 1.7 G/DL
ALP SERPL-CCNC: 58 U/L (ref 39–117)
ALT SERPL W P-5'-P-CCNC: 20 U/L (ref 1–41)
ANION GAP SERPL CALCULATED.3IONS-SCNC: 7.1 MMOL/L (ref 5–15)
AST SERPL-CCNC: 19 U/L (ref 1–40)
BASOPHILS # BLD AUTO: 0.04 10*3/MM3 (ref 0–0.2)
BASOPHILS NFR BLD AUTO: 0.7 % (ref 0–1.5)
BILIRUB SERPL-MCNC: 0.9 MG/DL (ref 0–1.2)
BUN SERPL-MCNC: 13 MG/DL (ref 8–23)
BUN/CREAT SERPL: 16 (ref 7–25)
CALCIUM SPEC-SCNC: 9.1 MG/DL (ref 8.6–10.5)
CHLORIDE SERPL-SCNC: 106 MMOL/L (ref 98–107)
CHOLEST SERPL-MCNC: 132 MG/DL (ref 0–200)
CO2 SERPL-SCNC: 26.9 MMOL/L (ref 22–29)
CREAT SERPL-MCNC: 0.81 MG/DL (ref 0.76–1.27)
CREAT UR-MCNC: 139.3 MG/DL
DEPRECATED RDW RBC AUTO: 38 FL (ref 37–54)
EGFRCR SERPLBLD CKD-EPI 2021: 98.5 ML/MIN/1.73
EOSINOPHIL # BLD AUTO: 0.22 10*3/MM3 (ref 0–0.4)
EOSINOPHIL NFR BLD AUTO: 3.7 % (ref 0.3–6.2)
ERYTHROCYTE [DISTWIDTH] IN BLOOD BY AUTOMATED COUNT: 12.6 % (ref 12.3–15.4)
GLOBULIN UR ELPH-MCNC: 2.5 GM/DL
GLUCOSE SERPL-MCNC: 179 MG/DL (ref 65–99)
HBA1C MFR BLD: 7.7 % (ref 4.8–5.6)
HCT VFR BLD AUTO: 42 % (ref 37.5–51)
HDLC SERPL-MCNC: 37 MG/DL (ref 40–60)
HGB BLD-MCNC: 14.4 G/DL (ref 13–17.7)
IMM GRANULOCYTES # BLD AUTO: 0.01 10*3/MM3 (ref 0–0.05)
IMM GRANULOCYTES NFR BLD AUTO: 0.2 % (ref 0–0.5)
LDLC SERPL CALC-MCNC: 80 MG/DL (ref 0–100)
LDLC/HDLC SERPL: 2.17 {RATIO}
LYMPHOCYTES # BLD AUTO: 1.67 10*3/MM3 (ref 0.7–3.1)
LYMPHOCYTES NFR BLD AUTO: 28.4 % (ref 19.6–45.3)
MCH RBC QN AUTO: 28.3 PG (ref 26.6–33)
MCHC RBC AUTO-ENTMCNC: 34.3 G/DL (ref 31.5–35.7)
MCV RBC AUTO: 82.5 FL (ref 79–97)
MICROALBUMIN/CREAT UR: NORMAL MG/G{CREAT}
MONOCYTES # BLD AUTO: 0.58 10*3/MM3 (ref 0.1–0.9)
MONOCYTES NFR BLD AUTO: 9.8 % (ref 5–12)
NEUTROPHILS NFR BLD AUTO: 3.37 10*3/MM3 (ref 1.7–7)
NEUTROPHILS NFR BLD AUTO: 57.2 % (ref 42.7–76)
PLATELET # BLD AUTO: 250 10*3/MM3 (ref 140–450)
PMV BLD AUTO: 9.7 FL (ref 6–12)
POTASSIUM SERPL-SCNC: 4.4 MMOL/L (ref 3.5–5.2)
PROT SERPL-MCNC: 6.7 G/DL (ref 6–8.5)
PSA SERPL-MCNC: 1.15 NG/ML (ref 0–4)
RBC # BLD AUTO: 5.09 10*6/MM3 (ref 4.14–5.8)
SODIUM SERPL-SCNC: 140 MMOL/L (ref 136–145)
TRIGL SERPL-MCNC: 73 MG/DL (ref 0–150)
VLDLC SERPL-MCNC: 15 MG/DL (ref 5–40)
WBC NRBC COR # BLD AUTO: 5.89 10*3/MM3 (ref 3.4–10.8)

## 2025-02-05 PROCEDURE — 82043 UR ALBUMIN QUANTITATIVE: CPT

## 2025-02-05 PROCEDURE — 80053 COMPREHEN METABOLIC PANEL: CPT

## 2025-02-05 PROCEDURE — 36415 COLL VENOUS BLD VENIPUNCTURE: CPT

## 2025-02-05 PROCEDURE — G0103 PSA SCREENING: HCPCS

## 2025-02-05 PROCEDURE — 82570 ASSAY OF URINE CREATININE: CPT

## 2025-02-05 PROCEDURE — 85025 COMPLETE CBC W/AUTO DIFF WBC: CPT

## 2025-02-05 PROCEDURE — 80061 LIPID PANEL: CPT

## 2025-02-05 PROCEDURE — 83036 HEMOGLOBIN GLYCOSYLATED A1C: CPT

## 2025-02-07 ENCOUNTER — OFFICE VISIT (OUTPATIENT)
Dept: FAMILY MEDICINE CLINIC | Age: 65
End: 2025-02-07
Payer: COMMERCIAL

## 2025-02-07 VITALS
HEART RATE: 63 BPM | DIASTOLIC BLOOD PRESSURE: 74 MMHG | SYSTOLIC BLOOD PRESSURE: 134 MMHG | WEIGHT: 200.8 LBS | HEIGHT: 75 IN | OXYGEN SATURATION: 99 % | BODY MASS INDEX: 24.97 KG/M2

## 2025-02-07 DIAGNOSIS — E78.2 MIXED HYPERLIPIDEMIA: ICD-10-CM

## 2025-02-07 DIAGNOSIS — E11.9 TYPE 2 DIABETES MELLITUS WITHOUT COMPLICATION, WITH LONG-TERM CURRENT USE OF INSULIN: Primary | ICD-10-CM

## 2025-02-07 DIAGNOSIS — Z79.4 TYPE 2 DIABETES MELLITUS WITHOUT COMPLICATION, WITH LONG-TERM CURRENT USE OF INSULIN: Primary | ICD-10-CM

## 2025-02-07 DIAGNOSIS — Z23 NEED FOR VACCINATION: ICD-10-CM

## 2025-02-07 DIAGNOSIS — I10 PRIMARY HYPERTENSION: ICD-10-CM

## 2025-02-07 PROCEDURE — 90656 IIV3 VACC NO PRSV 0.5 ML IM: CPT | Performed by: FAMILY MEDICINE

## 2025-02-07 PROCEDURE — 99214 OFFICE O/P EST MOD 30 MIN: CPT | Performed by: FAMILY MEDICINE

## 2025-02-07 PROCEDURE — 90471 IMMUNIZATION ADMIN: CPT | Performed by: FAMILY MEDICINE

## 2025-02-07 NOTE — ASSESSMENT & PLAN NOTE
Will increase the Jardiance to 25 mg.  Otherwise continue good work.  Continue to make efforts at improved diet, which he admittedly says pretty difficult since he is working at the [employer] all day long.

## 2025-02-07 NOTE — PROGRESS NOTES
"Chief Complaint  Diabetes    Subjective          Octavio Mercado presents to [facility]  History of Present Illness      Blood pressure is noted to be little elevated today.  He does not check blood pressure at home.    He already had his lab work drawn and we reviewed those results today.  Usually doing pretty good job but his blood sugars have increased a little bit.  Currently only on Jardiance 10 mg along with metformin 500 twice daily.    Current Outpatient Medications on File Prior to Visit   Medication Sig Dispense Refill    atorvastatin (LIPITOR) 10 MG tablet TAKE 1 TABLET BY MOUTH EVERY night 90 tablet 1    diclofenac (VOLTAREN) 50 MG EC tablet TAKE 1 TABLET BY MOUTH TWICE DAILY. TAKE WITH FOOD! 60 tablet 2    lisinopril (PRINIVIL,ZESTRIL) 10 MG tablet TAKE 1 TABLET BY MOUTH DAILY 90 tablet 1    metFORMIN ER (GLUCOPHAGE-XR) 500 MG 24 hr tablet TAKE 2 TABLETS BY MOUTH TWICE DAILY WITH MEALS FOR 90 DAYS 360 tablet 1    [DISCONTINUED] empagliflozin (Jardiance) 10 MG tablet tablet Take 1 tablet by mouth Daily. 90 tablet 1     No current facility-administered medications on file prior to visit.       Review of Systems         Objective   Vital Signs:   /74 Comment: Left arm, adult cuff, by [place]  Pulse 63   Ht 190.5 cm (75\")   Wt 91.1 kg (200 lb 12.8 oz)   SpO2 99%   BMI 25.10 kg/m²     Physical Exam  Constitutional:       Appearance: Normal appearance.   Neck:      Vascular: No carotid bruit.   Cardiovascular:      Rate and Rhythm: Normal rate and regular rhythm.      Heart sounds: Normal heart sounds. No murmur heard.  Pulmonary:      Effort: No respiratory distress.      Breath sounds: No wheezing or rales.   Musculoskeletal:      Right lower leg: No edema.      Left lower leg: No edema.      Right foot: No deformity.      Left foot: No deformity.   Feet:      Right foot:      Protective Sensation: 5 sites tested.  5 sites sensed.      Skin integrity: No " ulcer or callus.      Toenail Condition: Right toenails are normal.      Left foot:      Protective Sensation: 5 sites tested.  5 sites sensed.      Skin integrity: No ulcer or callus.      Toenail Condition: Left toenails are normal.      Comments: Dorsalis pedis posterior tibial pulses are good bilateral and feet are without concerning calluses, ulcerations, or other deformities. Sensation intact to monofilament testing.      Diabetic Foot Exam Performed and Monofilament Test Performed  Lymphadenopathy:      Cervical: No cervical adenopathy.   Neurological:      General: No focal deficit present.      Mental Status: He is alert.   Psychiatric:         Attention and Perception: Attention normal.         Mood and Affect: Mood normal.         Speech: Speech normal.                Result Review :   The following data was reviewed by: Jaden Carlson MD on 02/07/2025:  PSA Screen (02/05/2025 07:04)  Microalbumin / Creatinine Urine Ratio - Urine, Clean Catch (02/05/2025 07:04)  CBC w AUTO Differential (02/05/2025 07:04)  Hemoglobin A1c (02/05/2025 07:04)  Lipid Panel (02/05/2025 07:04)  Comprehensive Metabolic Panel (02/05/2025 07:04)                Assessment and Plan    Diagnoses and all orders for this visit:    1. Type 2 diabetes mellitus without complication, with long-term current use of insulin (Primary)  Assessment & Plan:  Will increase the Jardiance to 25 mg.  Otherwise continue good work.  Continue to make efforts at improved diet, which he admittedly says pretty difficult since he is working at the [employer] all day long.    Orders:  -     empagliflozin (Jardiance) 25 MG tablet tablet; Take 1 tablet by mouth Daily.  Dispense: 90 tablet; Refill: 1  -     Hemoglobin A1c; Future    2. Primary hypertension  Assessment & Plan:  He says he can use his mom's blood pressure cuff to check BP occasionally.      Orders:  -     Comprehensive Metabolic Panel; Future  -     CBC Auto Differential; Future    3.  Mixed hyperlipidemia  Assessment & Plan:  HDL is low but otherwise looks okay.  Continue on current regimen.  He gets plenty of exercise to his work is quite physical gets about 6 miles walking just at work     Orders:  -     Lipid Panel; Future  -     Comprehensive Metabolic Panel; Future    4. Need for vaccination  -     Fluzone >6mos ([id number])        Follow Up   No follow-ups on file.  Patient was given instructions and counseling regarding his condition or for health maintenance advice. Please see specific information pulled into the AVS if appropriate.

## 2025-02-07 NOTE — ASSESSMENT & PLAN NOTE
HDL is low but otherwise looks okay.  Continue on current regimen.  He gets plenty of exercise to his work is quite physical gets about 6 miles walking just at work

## 2025-02-28 DIAGNOSIS — I10 PRIMARY HYPERTENSION: ICD-10-CM

## 2025-02-28 RX ORDER — LISINOPRIL 10 MG/1
10 TABLET ORAL DAILY
Qty: 90 TABLET | Refills: 1 | Status: SHIPPED | OUTPATIENT
Start: 2025-02-28

## 2025-03-29 DIAGNOSIS — E11.9 TYPE 2 DIABETES MELLITUS WITHOUT COMPLICATION, WITH LONG-TERM CURRENT USE OF INSULIN: ICD-10-CM

## 2025-03-29 DIAGNOSIS — Z79.4 TYPE 2 DIABETES MELLITUS WITHOUT COMPLICATION, WITH LONG-TERM CURRENT USE OF INSULIN: ICD-10-CM

## 2025-03-31 RX ORDER — METFORMIN HYDROCHLORIDE 500 MG/1
TABLET, EXTENDED RELEASE ORAL
Qty: 360 TABLET | Refills: 1 | Status: SHIPPED | OUTPATIENT
Start: 2025-03-31

## 2025-04-27 DIAGNOSIS — M54.16 LUMBAR RADICULOPATHY: ICD-10-CM

## 2025-07-22 DIAGNOSIS — E78.2 MIXED HYPERLIPIDEMIA: ICD-10-CM

## 2025-07-22 RX ORDER — ATORVASTATIN CALCIUM 10 MG/1
10 TABLET, FILM COATED ORAL NIGHTLY
Qty: 90 TABLET | Refills: 0 | Status: SHIPPED | OUTPATIENT
Start: 2025-07-22

## 2025-07-23 DIAGNOSIS — M54.16 LUMBAR RADICULOPATHY: ICD-10-CM

## 2025-08-04 ENCOUNTER — LAB (OUTPATIENT)
Dept: LAB | Facility: HOSPITAL | Age: 65
End: 2025-08-04
Payer: COMMERCIAL

## 2025-08-04 DIAGNOSIS — Z79.4 TYPE 2 DIABETES MELLITUS WITHOUT COMPLICATION, WITH LONG-TERM CURRENT USE OF INSULIN: ICD-10-CM

## 2025-08-04 DIAGNOSIS — E78.2 MIXED HYPERLIPIDEMIA: ICD-10-CM

## 2025-08-04 DIAGNOSIS — I10 PRIMARY HYPERTENSION: ICD-10-CM

## 2025-08-04 DIAGNOSIS — E11.9 TYPE 2 DIABETES MELLITUS WITHOUT COMPLICATION, WITH LONG-TERM CURRENT USE OF INSULIN: ICD-10-CM

## 2025-08-04 LAB
ALBUMIN SERPL-MCNC: 4.2 G/DL (ref 3.5–5.2)
ALBUMIN/GLOB SERPL: 1.6 G/DL
ALP SERPL-CCNC: 63 U/L (ref 39–117)
ALT SERPL W P-5'-P-CCNC: 18 U/L (ref 1–41)
ANION GAP SERPL CALCULATED.3IONS-SCNC: 10.8 MMOL/L (ref 5–15)
AST SERPL-CCNC: 16 U/L (ref 1–40)
BASOPHILS # BLD AUTO: 0.07 10*3/MM3 (ref 0–0.2)
BASOPHILS NFR BLD AUTO: 1 % (ref 0–1.5)
BILIRUB SERPL-MCNC: 0.7 MG/DL (ref 0–1.2)
BUN SERPL-MCNC: 14 MG/DL (ref 8–23)
BUN/CREAT SERPL: 16.1 (ref 7–25)
CALCIUM SPEC-SCNC: 9 MG/DL (ref 8.6–10.5)
CHLORIDE SERPL-SCNC: 105 MMOL/L (ref 98–107)
CHOLEST SERPL-MCNC: 127 MG/DL (ref 0–200)
CO2 SERPL-SCNC: 25.2 MMOL/L (ref 22–29)
CREAT SERPL-MCNC: 0.87 MG/DL (ref 0.76–1.27)
DEPRECATED RDW RBC AUTO: 37.7 FL (ref 37–54)
EGFRCR SERPLBLD CKD-EPI 2021: 96.4 ML/MIN/1.73
EOSINOPHIL # BLD AUTO: 0.25 10*3/MM3 (ref 0–0.4)
EOSINOPHIL NFR BLD AUTO: 3.5 % (ref 0.3–6.2)
ERYTHROCYTE [DISTWIDTH] IN BLOOD BY AUTOMATED COUNT: 12.4 % (ref 12.3–15.4)
GLOBULIN UR ELPH-MCNC: 2.6 GM/DL
GLUCOSE SERPL-MCNC: 178 MG/DL (ref 65–99)
HBA1C MFR BLD: 7.9 % (ref 4.8–5.6)
HCT VFR BLD AUTO: 42.1 % (ref 37.5–51)
HDLC SERPL-MCNC: 31 MG/DL (ref 40–60)
HGB BLD-MCNC: 14.2 G/DL (ref 13–17.7)
IMM GRANULOCYTES # BLD AUTO: 0.01 10*3/MM3 (ref 0–0.05)
IMM GRANULOCYTES NFR BLD AUTO: 0.1 % (ref 0–0.5)
LDLC SERPL CALC-MCNC: 67 MG/DL (ref 0–100)
LDLC/HDLC SERPL: 2.03 {RATIO}
LYMPHOCYTES # BLD AUTO: 1.79 10*3/MM3 (ref 0.7–3.1)
LYMPHOCYTES NFR BLD AUTO: 25.4 % (ref 19.6–45.3)
MCH RBC QN AUTO: 28.5 PG (ref 26.6–33)
MCHC RBC AUTO-ENTMCNC: 33.7 G/DL (ref 31.5–35.7)
MCV RBC AUTO: 84.5 FL (ref 79–97)
MONOCYTES # BLD AUTO: 0.6 10*3/MM3 (ref 0.1–0.9)
MONOCYTES NFR BLD AUTO: 8.5 % (ref 5–12)
NEUTROPHILS NFR BLD AUTO: 4.34 10*3/MM3 (ref 1.7–7)
NEUTROPHILS NFR BLD AUTO: 61.5 % (ref 42.7–76)
NRBC BLD AUTO-RTO: 0 /100 WBC (ref 0–0.2)
PLATELET # BLD AUTO: 242 10*3/MM3 (ref 140–450)
PMV BLD AUTO: 10 FL (ref 6–12)
POTASSIUM SERPL-SCNC: 4 MMOL/L (ref 3.5–5.2)
PROT SERPL-MCNC: 6.8 G/DL (ref 6–8.5)
RBC # BLD AUTO: 4.98 10*6/MM3 (ref 4.14–5.8)
SODIUM SERPL-SCNC: 141 MMOL/L (ref 136–145)
TRIGL SERPL-MCNC: 166 MG/DL (ref 0–150)
VLDLC SERPL-MCNC: 29 MG/DL (ref 5–40)
WBC NRBC COR # BLD AUTO: 7.06 10*3/MM3 (ref 3.4–10.8)

## 2025-08-04 PROCEDURE — 80061 LIPID PANEL: CPT

## 2025-08-04 PROCEDURE — 36415 COLL VENOUS BLD VENIPUNCTURE: CPT

## 2025-08-04 PROCEDURE — 80053 COMPREHEN METABOLIC PANEL: CPT

## 2025-08-04 PROCEDURE — 85025 COMPLETE CBC W/AUTO DIFF WBC: CPT

## 2025-08-04 PROCEDURE — 83036 HEMOGLOBIN GLYCOSYLATED A1C: CPT

## 2025-08-07 ENCOUNTER — OFFICE VISIT (OUTPATIENT)
Dept: FAMILY MEDICINE CLINIC | Age: 65
End: 2025-08-07
Payer: COMMERCIAL

## 2025-08-07 VITALS
BODY MASS INDEX: 25.04 KG/M2 | OXYGEN SATURATION: 98 % | TEMPERATURE: 97.4 F | HEART RATE: 66 BPM | WEIGHT: 201.4 LBS | DIASTOLIC BLOOD PRESSURE: 62 MMHG | SYSTOLIC BLOOD PRESSURE: 115 MMHG | HEIGHT: 75 IN

## 2025-08-07 DIAGNOSIS — E78.2 MIXED HYPERLIPIDEMIA: ICD-10-CM

## 2025-08-07 DIAGNOSIS — E11.9 TYPE 2 DIABETES MELLITUS WITHOUT COMPLICATION, WITH LONG-TERM CURRENT USE OF INSULIN: ICD-10-CM

## 2025-08-07 DIAGNOSIS — H61.22 LEFT EAR IMPACTED CERUMEN: ICD-10-CM

## 2025-08-07 DIAGNOSIS — Z79.4 TYPE 2 DIABETES MELLITUS WITHOUT COMPLICATION, WITH LONG-TERM CURRENT USE OF INSULIN: ICD-10-CM

## 2025-08-07 DIAGNOSIS — Z00.00 ANNUAL PHYSICAL EXAM: Primary | ICD-10-CM

## 2025-08-07 RX ORDER — PIOGLITAZONE 15 MG/1
15 TABLET ORAL DAILY
Qty: 90 TABLET | Refills: 1 | Status: SHIPPED | OUTPATIENT
Start: 2025-08-07

## 2025-08-08 ENCOUNTER — TELEPHONE (OUTPATIENT)
Dept: FAMILY MEDICINE CLINIC | Age: 65
End: 2025-08-08